# Patient Record
Sex: MALE | Race: WHITE | NOT HISPANIC OR LATINO | Employment: OTHER | URBAN - NONMETROPOLITAN AREA
[De-identification: names, ages, dates, MRNs, and addresses within clinical notes are randomized per-mention and may not be internally consistent; named-entity substitution may affect disease eponyms.]

---

## 2020-03-16 ENCOUNTER — HOSPITAL ENCOUNTER (INPATIENT)
Facility: HOSPITAL | Age: 55
LOS: 3 days | Discharge: HOME | End: 2020-03-19
Attending: INTERNAL MEDICINE | Admitting: INTERNAL MEDICINE

## 2020-03-16 DIAGNOSIS — I21.4 NSTEMI (NON-ST ELEVATED MYOCARDIAL INFARCTION) (HCC): Primary | ICD-10-CM

## 2020-03-16 PROCEDURE — 4A023N7 MEASUREMENT OF CARDIAC SAMPLING AND PRESSURE, LEFT HEART, PERCUTANEOUS APPROACH: ICD-10-PCS | Performed by: INTERNAL MEDICINE

## 2020-03-16 PROCEDURE — 4A033BC MEASUREMENT OF ARTERIAL PRESSURE, CORONARY, PERCUTANEOUS APPROACH: ICD-10-PCS | Performed by: INTERNAL MEDICINE

## 2020-03-16 PROCEDURE — B2151ZZ FLUOROSCOPY OF LEFT HEART USING LOW OSMOLAR CONTRAST: ICD-10-PCS | Performed by: INTERNAL MEDICINE

## 2020-03-16 PROCEDURE — 027036Z DILATION OF CORONARY ARTERY, ONE ARTERY WITH THREE DRUG-ELUTING INTRALUMINAL DEVICES, PERCUTANEOUS APPROACH: ICD-10-PCS | Performed by: INTERNAL MEDICINE

## 2020-03-16 PROCEDURE — B2111ZZ FLUOROSCOPY OF MULTIPLE CORONARY ARTERIES USING LOW OSMOLAR CONTRAST: ICD-10-PCS | Performed by: INTERNAL MEDICINE

## 2020-03-17 ENCOUNTER — APPOINTMENT (OUTPATIENT)
Dept: CARDIOLOGY | Facility: HOSPITAL | Age: 55
End: 2020-03-17

## 2020-03-17 PROBLEM — I21.4 NSTEMI (NON-ST ELEVATED MYOCARDIAL INFARCTION) (HCC): Status: ACTIVE | Noted: 2020-03-17

## 2020-03-17 LAB
ANION GAP SERPL CALCULATED.3IONS-SCNC: 12.3 MMOL/L (ref 5–15)
APTT PPP: 46.5 SECONDS (ref 70–100)
APTT PPP: 77.2 SECONDS (ref 70–100)
BASOPHILS # BLD AUTO: 0.15 10*3/MM3 (ref 0–0.2)
BASOPHILS NFR BLD AUTO: 1 % (ref 0–1.5)
BUN BLD-MCNC: 5 MG/DL (ref 6–20)
BUN/CREAT SERPL: 7.2 (ref 7–25)
CALCIUM SPEC-SCNC: 9.2 MG/DL (ref 8.6–10.5)
CHLORIDE SERPL-SCNC: 101 MMOL/L (ref 98–107)
CHOLEST SERPL-MCNC: 134 MG/DL (ref 0–200)
CK MB SERPL-CCNC: 168.6 NG/ML
CK SERPL-CCNC: 997 U/L (ref 20–200)
CO2 SERPL-SCNC: 20.7 MMOL/L (ref 22–29)
CREAT BLD-MCNC: 0.69 MG/DL (ref 0.76–1.27)
DEPRECATED RDW RBC AUTO: 49.8 FL (ref 37–54)
DEPRECATED RDW RBC AUTO: 51.4 FL (ref 37–54)
EOSINOPHIL # BLD AUTO: 0.3 10*3/MM3 (ref 0–0.4)
EOSINOPHIL # BLD MANUAL: 0.35 10*3/MM3 (ref 0–0.4)
EOSINOPHIL NFR BLD AUTO: 2.1 % (ref 0.3–6.2)
EOSINOPHIL NFR BLD MANUAL: 3 % (ref 0.3–6.2)
ERYTHROCYTE [DISTWIDTH] IN BLOOD BY AUTOMATED COUNT: 14.6 % (ref 12.3–15.4)
ERYTHROCYTE [DISTWIDTH] IN BLOOD BY AUTOMATED COUNT: 14.8 % (ref 12.3–15.4)
GFR SERPL CREATININE-BSD FRML MDRD: 118 ML/MIN/1.73
GLUCOSE BLD-MCNC: 101 MG/DL (ref 65–99)
HBA1C MFR BLD: 4.8 % (ref 4.8–5.6)
HCT VFR BLD AUTO: 56.8 % (ref 37.5–51)
HCT VFR BLD AUTO: 57.3 % (ref 37.5–51)
HDLC SERPL-MCNC: 44 MG/DL (ref 40–60)
HGB BLD-MCNC: 18.7 G/DL (ref 13–17.7)
HGB BLD-MCNC: 19.1 G/DL (ref 13–17.7)
IMM GRANULOCYTES # BLD AUTO: 0.06 10*3/MM3 (ref 0–0.05)
IMM GRANULOCYTES NFR BLD AUTO: 0.4 % (ref 0–0.5)
INR PPP: 1.1 (ref 0.9–1.1)
LARGE PLATELETS: ABNORMAL
LDLC SERPL CALC-MCNC: 81 MG/DL (ref 0–100)
LDLC/HDLC SERPL: 1.85 {RATIO}
LYMPHOCYTES # BLD AUTO: 3.62 10*3/MM3 (ref 0.7–3.1)
LYMPHOCYTES # BLD MANUAL: 2.55 10*3/MM3 (ref 0.7–3.1)
LYMPHOCYTES NFR BLD AUTO: 24.9 % (ref 19.6–45.3)
LYMPHOCYTES NFR BLD MANUAL: 13 % (ref 5–12)
LYMPHOCYTES NFR BLD MANUAL: 22 % (ref 19.6–45.3)
MAXIMAL PREDICTED HEART RATE: 163 BPM
MCH RBC QN AUTO: 31.2 PG (ref 26.6–33)
MCH RBC QN AUTO: 31.3 PG (ref 26.6–33)
MCHC RBC AUTO-ENTMCNC: 32.9 G/DL (ref 31.5–35.7)
MCHC RBC AUTO-ENTMCNC: 33.3 G/DL (ref 31.5–35.7)
MCV RBC AUTO: 93.6 FL (ref 79–97)
MCV RBC AUTO: 95 FL (ref 79–97)
MONOCYTES # BLD AUTO: 1.01 10*3/MM3 (ref 0.1–0.9)
MONOCYTES # BLD AUTO: 1.51 10*3/MM3 (ref 0.1–0.9)
MONOCYTES NFR BLD AUTO: 7 % (ref 5–12)
NEUTROPHILS # BLD AUTO: 6.95 10*3/MM3 (ref 1.7–7)
NEUTROPHILS # BLD AUTO: 9.38 10*3/MM3 (ref 1.7–7)
NEUTROPHILS NFR BLD AUTO: 64.6 % (ref 42.7–76)
NEUTROPHILS NFR BLD MANUAL: 60 % (ref 42.7–76)
NRBC BLD AUTO-RTO: 0 /100 WBC (ref 0–0.2)
PLATELET # BLD AUTO: 267 10*3/MM3 (ref 140–450)
PLATELET # BLD AUTO: 290 10*3/MM3 (ref 140–450)
PMV BLD AUTO: 9.4 FL (ref 6–12)
PMV BLD AUTO: 9.6 FL (ref 6–12)
POTASSIUM BLD-SCNC: 4.2 MMOL/L (ref 3.5–5.2)
PROTHROMBIN TIME: 14.7 SECONDS (ref 12–15.1)
RBC # BLD AUTO: 5.98 10*6/MM3 (ref 4.14–5.8)
RBC # BLD AUTO: 6.12 10*6/MM3 (ref 4.14–5.8)
RBC MORPH BLD: NORMAL
SMALL PLATELETS BLD QL SMEAR: ADEQUATE
SODIUM BLD-SCNC: 134 MMOL/L (ref 136–145)
STRESS TARGET HR: 139 BPM
TRIGL SERPL-MCNC: 43 MG/DL (ref 0–150)
TROPONIN T SERPL-MCNC: 0.89 NG/ML (ref 0–0.03)
TROPONIN T SERPL-MCNC: 1.28 NG/ML (ref 0–0.03)
TROPONIN T SERPL-MCNC: 1.54 NG/ML (ref 0–0.03)
VARIANT LYMPHS NFR BLD MANUAL: 2 % (ref 0–5)
VLDLC SERPL-MCNC: 8.6 MG/DL
WBC MORPH BLD: NORMAL
WBC NRBC COR # BLD: 11.58 10*3/MM3 (ref 3.4–10.8)
WBC NRBC COR # BLD: 14.52 10*3/MM3 (ref 3.4–10.8)

## 2020-03-17 PROCEDURE — 80048 BASIC METABOLIC PNL TOTAL CA: CPT | Performed by: INTERNAL MEDICINE

## 2020-03-17 PROCEDURE — 25010000002 ENOXAPARIN PER 10 MG: Performed by: INTERNAL MEDICINE

## 2020-03-17 PROCEDURE — 93306 TTE W/DOPPLER COMPLETE: CPT

## 2020-03-17 PROCEDURE — C1769 GUIDE WIRE: HCPCS | Performed by: INTERNAL MEDICINE

## 2020-03-17 PROCEDURE — 25010000002 MORPHINE PER 10 MG: Performed by: INTERNAL MEDICINE

## 2020-03-17 PROCEDURE — 85730 THROMBOPLASTIN TIME PARTIAL: CPT | Performed by: INTERNAL MEDICINE

## 2020-03-17 PROCEDURE — 93005 ELECTROCARDIOGRAM TRACING: CPT | Performed by: INTERNAL MEDICINE

## 2020-03-17 PROCEDURE — 85610 PROTHROMBIN TIME: CPT | Performed by: INTERNAL MEDICINE

## 2020-03-17 PROCEDURE — 99223 1ST HOSP IP/OBS HIGH 75: CPT | Performed by: INTERNAL MEDICINE

## 2020-03-17 PROCEDURE — 93458 L HRT ARTERY/VENTRICLE ANGIO: CPT | Performed by: INTERNAL MEDICINE

## 2020-03-17 PROCEDURE — C1725 CATH, TRANSLUMIN NON-LASER: HCPCS | Performed by: INTERNAL MEDICINE

## 2020-03-17 PROCEDURE — 25010000002 EPTIFIBATIDE PER 5 MG: Performed by: INTERNAL MEDICINE

## 2020-03-17 PROCEDURE — 85025 COMPLETE CBC W/AUTO DIFF WBC: CPT | Performed by: INTERNAL MEDICINE

## 2020-03-17 PROCEDURE — 25010000002 BIVALIRUDIN TRIFLUOROACETATE 250 MG RECONSTITUTED SOLUTION 1 EACH VIAL: Performed by: INTERNAL MEDICINE

## 2020-03-17 PROCEDURE — C1887 CATHETER, GUIDING: HCPCS | Performed by: INTERNAL MEDICINE

## 2020-03-17 PROCEDURE — 85027 COMPLETE CBC AUTOMATED: CPT | Performed by: INTERNAL MEDICINE

## 2020-03-17 PROCEDURE — 80061 LIPID PANEL: CPT | Performed by: INTERNAL MEDICINE

## 2020-03-17 PROCEDURE — 25010000003 LIDOCAINE 1 % SOLUTION: Performed by: INTERNAL MEDICINE

## 2020-03-17 PROCEDURE — 82553 CREATINE MB FRACTION: CPT | Performed by: INTERNAL MEDICINE

## 2020-03-17 PROCEDURE — 25010000002 ADENOSINE (DIAGNOSTIC) PER 30 MG: Performed by: INTERNAL MEDICINE

## 2020-03-17 PROCEDURE — 25010000002 HEPARIN (PORCINE) PER 1000 UNITS: Performed by: INTERNAL MEDICINE

## 2020-03-17 PROCEDURE — 83036 HEMOGLOBIN GLYCOSYLATED A1C: CPT | Performed by: INTERNAL MEDICINE

## 2020-03-17 PROCEDURE — 83695 ASSAY OF LIPOPROTEIN(A): CPT | Performed by: INTERNAL MEDICINE

## 2020-03-17 PROCEDURE — 94799 UNLISTED PULMONARY SVC/PX: CPT

## 2020-03-17 PROCEDURE — 93571 IV DOP VEL&/PRESS C FLO 1ST: CPT | Performed by: INTERNAL MEDICINE

## 2020-03-17 PROCEDURE — C9600 PERC DRUG-EL COR STENT SING: HCPCS | Performed by: INTERNAL MEDICINE

## 2020-03-17 PROCEDURE — 84484 ASSAY OF TROPONIN QUANT: CPT | Performed by: INTERNAL MEDICINE

## 2020-03-17 PROCEDURE — 99153 MOD SED SAME PHYS/QHP EA: CPT | Performed by: INTERNAL MEDICINE

## 2020-03-17 PROCEDURE — 0 IOPAMIDOL PER 1 ML: Performed by: INTERNAL MEDICINE

## 2020-03-17 PROCEDURE — 25010000002 FENTANYL CITRATE (PF) 100 MCG/2ML SOLUTION: Performed by: INTERNAL MEDICINE

## 2020-03-17 PROCEDURE — 85007 BL SMEAR W/DIFF WBC COUNT: CPT | Performed by: INTERNAL MEDICINE

## 2020-03-17 PROCEDURE — 82550 ASSAY OF CK (CPK): CPT | Performed by: INTERNAL MEDICINE

## 2020-03-17 PROCEDURE — C1874 STENT, COATED/COV W/DEL SYS: HCPCS | Performed by: INTERNAL MEDICINE

## 2020-03-17 PROCEDURE — C1894 INTRO/SHEATH, NON-LASER: HCPCS | Performed by: INTERNAL MEDICINE

## 2020-03-17 PROCEDURE — 25010000002 MIDAZOLAM PER 1MG: Performed by: INTERNAL MEDICINE

## 2020-03-17 PROCEDURE — 99152 MOD SED SAME PHYS/QHP 5/>YRS: CPT | Performed by: INTERNAL MEDICINE

## 2020-03-17 DEVICE — XIENCE SIERRA™ EVEROLIMUS ELUTING CORONARY STENT SYSTEM 3.50 MM X 18 MM / RAPID-EXCHANGE
Type: IMPLANTABLE DEVICE | Status: FUNCTIONAL
Brand: XIENCE SIERRA™

## 2020-03-17 DEVICE — XIENCE SIERRA™ EVEROLIMUS ELUTING CORONARY STENT SYSTEM 2.75 MM X 23 MM / RAPID-EXCHANGE
Type: IMPLANTABLE DEVICE | Status: FUNCTIONAL
Brand: XIENCE SIERRA™

## 2020-03-17 RX ORDER — EPTIFIBATIDE 0.75 MG/ML
INJECTION, SOLUTION INTRAVENOUS CONTINUOUS PRN
Status: COMPLETED | OUTPATIENT
Start: 2020-03-17 | End: 2020-03-17

## 2020-03-17 RX ORDER — ACETAMINOPHEN 650 MG/1
650 SUPPOSITORY RECTAL EVERY 4 HOURS PRN
Status: DISCONTINUED | OUTPATIENT
Start: 2020-03-17 | End: 2020-03-19 | Stop reason: HOSPADM

## 2020-03-17 RX ORDER — SODIUM CHLORIDE 9 MG/ML
100 INJECTION, SOLUTION INTRAVENOUS CONTINUOUS
Status: DISCONTINUED | OUTPATIENT
Start: 2020-03-17 | End: 2020-03-17

## 2020-03-17 RX ORDER — ACETAMINOPHEN 325 MG/1
650 TABLET ORAL EVERY 4 HOURS PRN
Status: DISCONTINUED | OUTPATIENT
Start: 2020-03-17 | End: 2020-03-19 | Stop reason: HOSPADM

## 2020-03-17 RX ORDER — ONDANSETRON 2 MG/ML
4 INJECTION INTRAMUSCULAR; INTRAVENOUS EVERY 6 HOURS PRN
Status: DISCONTINUED | OUTPATIENT
Start: 2020-03-17 | End: 2020-03-17

## 2020-03-17 RX ORDER — EPTIFIBATIDE 0.75 MG/ML
2 INJECTION, SOLUTION INTRAVENOUS CONTINUOUS
Status: DISPENSED | OUTPATIENT
Start: 2020-03-17 | End: 2020-03-17

## 2020-03-17 RX ORDER — ONDANSETRON 2 MG/ML
4 INJECTION INTRAMUSCULAR; INTRAVENOUS EVERY 6 HOURS PRN
Status: DISCONTINUED | OUTPATIENT
Start: 2020-03-17 | End: 2020-03-19 | Stop reason: HOSPADM

## 2020-03-17 RX ORDER — ASPIRIN 81 MG/1
81 TABLET ORAL DAILY
Status: DISCONTINUED | OUTPATIENT
Start: 2020-03-17 | End: 2020-03-19 | Stop reason: HOSPADM

## 2020-03-17 RX ORDER — LOSARTAN POTASSIUM 50 MG/1
50 TABLET ORAL DAILY
Status: DISCONTINUED | OUTPATIENT
Start: 2020-03-17 | End: 2020-03-19 | Stop reason: HOSPADM

## 2020-03-17 RX ORDER — HYDROCODONE BITARTRATE AND ACETAMINOPHEN 5; 325 MG/1; MG/1
1 TABLET ORAL EVERY 4 HOURS PRN
Status: DISCONTINUED | OUTPATIENT
Start: 2020-03-17 | End: 2020-03-19 | Stop reason: HOSPADM

## 2020-03-17 RX ORDER — NITROGLYCERIN 0.4 MG/1
0.4 TABLET SUBLINGUAL
Status: DISCONTINUED | OUTPATIENT
Start: 2020-03-17 | End: 2020-03-19 | Stop reason: HOSPADM

## 2020-03-17 RX ORDER — LIDOCAINE HYDROCHLORIDE 10 MG/ML
INJECTION, SOLUTION INFILTRATION; PERINEURAL AS NEEDED
Status: DISCONTINUED | OUTPATIENT
Start: 2020-03-17 | End: 2020-03-17 | Stop reason: HOSPADM

## 2020-03-17 RX ORDER — ALPRAZOLAM 0.5 MG/1
0.5 TABLET ORAL 3 TIMES DAILY PRN
Status: DISCONTINUED | OUTPATIENT
Start: 2020-03-17 | End: 2020-03-19 | Stop reason: HOSPADM

## 2020-03-17 RX ORDER — MORPHINE SULFATE 2 MG/ML
2 INJECTION, SOLUTION INTRAMUSCULAR; INTRAVENOUS EVERY 4 HOURS PRN
Status: DISCONTINUED | OUTPATIENT
Start: 2020-03-17 | End: 2020-03-19 | Stop reason: HOSPADM

## 2020-03-17 RX ORDER — FENTANYL CITRATE 50 UG/ML
INJECTION, SOLUTION INTRAMUSCULAR; INTRAVENOUS AS NEEDED
Status: DISCONTINUED | OUTPATIENT
Start: 2020-03-17 | End: 2020-03-17 | Stop reason: HOSPADM

## 2020-03-17 RX ORDER — HEPARIN SODIUM 10000 [USP'U]/100ML
12 INJECTION, SOLUTION INTRAVENOUS
Status: CANCELLED | OUTPATIENT
Start: 2020-03-17

## 2020-03-17 RX ORDER — ATORVASTATIN CALCIUM 80 MG/1
80 TABLET, FILM COATED ORAL NIGHTLY
Status: DISCONTINUED | OUTPATIENT
Start: 2020-03-17 | End: 2020-03-19 | Stop reason: HOSPADM

## 2020-03-17 RX ORDER — ACETAMINOPHEN 325 MG/1
650 TABLET ORAL EVERY 4 HOURS PRN
Status: DISCONTINUED | OUTPATIENT
Start: 2020-03-17 | End: 2020-03-17

## 2020-03-17 RX ORDER — NICOTINE 21 MG/24HR
1 PATCH, TRANSDERMAL 24 HOURS TRANSDERMAL EVERY 24 HOURS
Status: DISCONTINUED | OUTPATIENT
Start: 2020-03-17 | End: 2020-03-19 | Stop reason: HOSPADM

## 2020-03-17 RX ORDER — METOPROLOL SUCCINATE 25 MG/1
25 TABLET, EXTENDED RELEASE ORAL
Status: DISCONTINUED | OUTPATIENT
Start: 2020-03-17 | End: 2020-03-19 | Stop reason: HOSPADM

## 2020-03-17 RX ORDER — NALOXONE HCL 0.4 MG/ML
0.4 VIAL (ML) INJECTION
Status: DISCONTINUED | OUTPATIENT
Start: 2020-03-17 | End: 2020-03-19 | Stop reason: HOSPADM

## 2020-03-17 RX ORDER — HYDROCODONE BITARTRATE AND ACETAMINOPHEN 5; 325 MG/1; MG/1
1 TABLET ORAL EVERY 4 HOURS PRN
Status: DISCONTINUED | OUTPATIENT
Start: 2020-03-17 | End: 2020-03-17

## 2020-03-17 RX ORDER — MIDAZOLAM HYDROCHLORIDE 2 MG/2ML
INJECTION, SOLUTION INTRAMUSCULAR; INTRAVENOUS AS NEEDED
Status: DISCONTINUED | OUTPATIENT
Start: 2020-03-17 | End: 2020-03-17 | Stop reason: HOSPADM

## 2020-03-17 RX ORDER — MORPHINE SULFATE 2 MG/ML
2 INJECTION, SOLUTION INTRAMUSCULAR; INTRAVENOUS ONCE
Status: COMPLETED | OUTPATIENT
Start: 2020-03-17 | End: 2020-03-17

## 2020-03-17 RX ORDER — SODIUM CHLORIDE 0.9 % (FLUSH) 0.9 %
10 SYRINGE (ML) INJECTION EVERY 12 HOURS SCHEDULED
Status: DISCONTINUED | OUTPATIENT
Start: 2020-03-17 | End: 2020-03-19 | Stop reason: HOSPADM

## 2020-03-17 RX ORDER — ASPIRIN 81 MG/1
81 TABLET ORAL DAILY
Status: DISCONTINUED | OUTPATIENT
Start: 2020-03-17 | End: 2020-03-17

## 2020-03-17 RX ORDER — HEPARIN SODIUM 10000 [USP'U]/100ML
1000 INJECTION, SOLUTION INTRAVENOUS
Status: DISCONTINUED | OUTPATIENT
Start: 2020-03-17 | End: 2020-03-17

## 2020-03-17 RX ORDER — SODIUM CHLORIDE 9 MG/ML
100 INJECTION, SOLUTION INTRAVENOUS CONTINUOUS
Status: DISCONTINUED | OUTPATIENT
Start: 2020-03-17 | End: 2020-03-18

## 2020-03-17 RX ORDER — SODIUM CHLORIDE 0.9 % (FLUSH) 0.9 %
10 SYRINGE (ML) INJECTION AS NEEDED
Status: DISCONTINUED | OUTPATIENT
Start: 2020-03-17 | End: 2020-03-19 | Stop reason: HOSPADM

## 2020-03-17 RX ADMIN — SODIUM CHLORIDE, PRESERVATIVE FREE 10 ML: 5 INJECTION INTRAVENOUS at 10:00

## 2020-03-17 RX ADMIN — ENOXAPARIN SODIUM 40 MG: 40 INJECTION SUBCUTANEOUS at 09:58

## 2020-03-17 RX ADMIN — HYDROCODONE BITARTRATE AND ACETAMINOPHEN 1 TABLET: 5; 325 TABLET ORAL at 19:17

## 2020-03-17 RX ADMIN — ALPRAZOLAM 0.5 MG: 0.5 TABLET ORAL at 19:17

## 2020-03-17 RX ADMIN — SODIUM CHLORIDE, PRESERVATIVE FREE 10 ML: 5 INJECTION INTRAVENOUS at 21:31

## 2020-03-17 RX ADMIN — HEPARIN SODIUM AND DEXTROSE 1000 UNITS/HR: 10000; 5 INJECTION INTRAVENOUS at 01:08

## 2020-03-17 RX ADMIN — MORPHINE SULFATE 2 MG: 2 INJECTION, SOLUTION INTRAMUSCULAR; INTRAVENOUS at 22:53

## 2020-03-17 RX ADMIN — ATORVASTATIN CALCIUM 80 MG: 80 TABLET, FILM COATED ORAL at 00:56

## 2020-03-17 RX ADMIN — LOSARTAN POTASSIUM 50 MG: 50 TABLET, FILM COATED ORAL at 09:58

## 2020-03-17 RX ADMIN — MORPHINE SULFATE 2 MG: 2 INJECTION, SOLUTION INTRAMUSCULAR; INTRAVENOUS at 02:04

## 2020-03-17 RX ADMIN — TICAGRELOR 90 MG: 90 TABLET ORAL at 09:59

## 2020-03-17 RX ADMIN — HYDROCODONE BITARTRATE AND ACETAMINOPHEN 1 TABLET: 5; 325 TABLET ORAL at 14:38

## 2020-03-17 RX ADMIN — NITROGLYCERIN 0.4 MG: 0.4 TABLET SUBLINGUAL at 05:07

## 2020-03-17 RX ADMIN — ALPRAZOLAM 0.5 MG: 0.5 TABLET ORAL at 10:55

## 2020-03-17 RX ADMIN — EPTIFIBATIDE 2 MCG/KG/MIN: 75 INJECTION INTRAVENOUS at 09:42

## 2020-03-17 RX ADMIN — METOPROLOL SUCCINATE 25 MG: 25 TABLET, EXTENDED RELEASE ORAL at 09:59

## 2020-03-17 RX ADMIN — SODIUM CHLORIDE 100 ML/HR: 9 INJECTION, SOLUTION INTRAVENOUS at 21:31

## 2020-03-17 RX ADMIN — NICOTINE 1 PATCH: 21 PATCH TRANSDERMAL at 09:59

## 2020-03-17 RX ADMIN — HYDROCODONE BITARTRATE AND ACETAMINOPHEN 1 TABLET: 5; 325 TABLET ORAL at 09:59

## 2020-03-17 RX ADMIN — TICAGRELOR 90 MG: 90 TABLET ORAL at 21:30

## 2020-03-17 RX ADMIN — SODIUM CHLORIDE, PRESERVATIVE FREE 10 ML: 5 INJECTION INTRAVENOUS at 00:19

## 2020-03-17 RX ADMIN — NITROGLYCERIN 0.4 MG: 0.4 TABLET SUBLINGUAL at 05:02

## 2020-03-17 RX ADMIN — MORPHINE SULFATE 2 MG: 2 INJECTION, SOLUTION INTRAMUSCULAR; INTRAVENOUS at 00:58

## 2020-03-17 RX ADMIN — ASPIRIN 81 MG: 81 TABLET, COATED ORAL at 09:58

## 2020-03-17 RX ADMIN — ATORVASTATIN CALCIUM 80 MG: 80 TABLET, FILM COATED ORAL at 21:30

## 2020-03-17 RX ADMIN — SODIUM CHLORIDE 100 ML/HR: 9 INJECTION, SOLUTION INTRAVENOUS at 09:45

## 2020-03-17 RX ADMIN — MORPHINE SULFATE 2 MG: 2 INJECTION, SOLUTION INTRAMUSCULAR; INTRAVENOUS at 09:41

## 2020-03-17 NOTE — H&P
[facility]   HISTORY AND PHYSICAL      Name:  Flavio Haines   Age:  55 y.o.  Sex:  male  :  1965  MRN:  [id number]   Visit Number:  [id number]  Admission Date:  3/16/2020  Date Of Service:  20  Primary Care Physician:  Provider, No Known    Chief Complaint:     Chest pain    History Of Presenting Illness:      Patient is a 55-year-old male with no past medical history and does not see a primary physician who presents to the emergency room at [facility] with complaints of left-sided chest pain/pressure.  Patient is accompanied by his wife who assists with history.  She states that he awoke at approximately 4 AM this morning complaining of left-sided chest pressure with radiation down the left arm.  Patient took a Tylenol without resolution of symptoms but still went to work today.  He said that his pain then developed more similar to indigestion.  It continued on and off all day was not alleviated by anything.  Patient was then convinced by his wife to report to the emergency room.  Outside EKG did not show signs of ST elevation.  Patient did have significant elevation of troponin at 16.669.  CK-MB was 198 and proBNP was 456.  At 2115 patient received 324 mg aspirin and 180 mg of Brilinta.  He also received 4 mg IV morphine prior to transfer.  Patient also started on heparin drip prior to transfer.  Patient states that his younger brother  at 44 years of age secondary to a myocardial infarction.  His mother has had cardiac stenting in the past.  Patient continues to smoke approximately 1 to 2 packs/day.  No significant alcohol use.  Dr. Dixon was consulted in transfer.    Review Of Systems:     General ROS: Patient denies any fevers, chills or loss of consciousness.   Psychological ROS: No history of any hallucinations and delusions.  Ophthalmic ROS: No history of any diplopia or transient loss of vision.  ENT ROS: No history of sore throat, nasal congestion or ear pain.     Allergy and Immunology ROS: No history of rash or itching.  Hematological and Lymphatic ROS: No history of neck swelling or easy bleeding.  Endocrine ROS: No history of any recent unintentional weight gain or loss.  Respiratory ROS: No history of cough or shortness of breath.   Cardiovascular ROS: Patient positive for epigastric pain chest pressure with left-sided arm pain.  Gastrointestinal ROS: No history of nausea and vomiting. Denies any abdominal pain. No diarrhea.   Genito-Urinary ROS: No history of dysuria or hematuria.  Musculoskeletal ROS: No muscle pain. No calf pain.   Neurological ROS: No history of any focal weakness. No loss of consciousness.   Dermatological ROS: No history of any redness or pruritis.     Past Medical History:    No past medical history on file.    Past Surgical history:    No past surgical history on file.    Social History:    Social History     Socioeconomic History   • Marital status:      Spouse name: Not on file   • Number of children: Not on file   • Years of education: Not on file   • Highest education level: Not on file       Family History:    No family history on file.    Allergies:      Nyquil multi-symptom [[id number]-doxylamine-dm-apap] and Penicillins    Home Medications:    Prior to Admission Medications     None             ED Medications:    Medications   sodium chloride 0.9 % flush 10 mL (has no administration in time range)   sodium chloride 0.9 % flush 10 mL (10 mL Intravenous Given 3/17/20 0019)   metoprolol tartrate (LOPRESSOR) tablet 25 mg (has no administration in time range)   losartan (COZAAR) tablet 50 mg (has no administration in time range)   atorvastatin (LIPITOR) tablet 80 mg (has no administration in time range)   acetaminophen (TYLENOL) tablet 650 mg (has no administration in time range)     Or   acetaminophen (TYLENOL) suppository 650 mg (has no administration in time range)   Morphine sulfate (PF) injection 2 mg (has no administration in  time range)     And   naloxone (NARCAN) injection 0.4 mg (has no administration in time range)   ondansetron (ZOFRAN) injection 4 mg (has no administration in time range)   heparin 78294 units/250 ml (100 units/ml) in D5W (has no administration in time range)       Vital Signs:    Temp:  [98 °F (36.7 °C)] 98 °F (36.7 °C)  Heart Rate:  [64-85] 64  Resp:  [22] 22  BP: (112-125)/(82-96) 125/82        03/17/20  0002   Weight: 91.1 kg (200 lb 14.4 oz)       Body mass index is 30.55 kg/m².    Physical Exam:    General Appearance:  Alert and cooperative, not in any acute distress.   Head:  Atraumatic and normocephalic, without obvious abnormality.   Eyes:          PERRLA, conjunctivae and sclerae normal, no Icterus. No pallor. Extraocular movements are within normal limits.   Ears:  Ears appear intact with no abnormalities noted.   Throat: No oral lesions, no thrush, oral mucosa moist.   Neck: Supple, trachea midline       Lungs:   Chest shape is normal. Breath sounds heard bilaterally equally.  No crackles or wheezing. No Pleural rub or bronchial breathing.   Heart:  Normal S1 and S2, no murmur, no gallop, no rub. No JVD.   Abdomen:   Normal bowel sounds, Soft, non-tender, non-distended, no guarding, no rebound tenderness.   Extremities: Moves all extremities well, no edema, no cyanosis, no clubbing.   Pulses: Pulses palpable and equal bilaterally.   Skin: No bleeding, bruising or rash.   Neurologic: Alert and oriented x 3. Moves all four limbs equally. No tremors. No facial asymmetry.     Laboratory data:    I have reviewed the labs done in the emergency room.          Invalid input(s): LABALBU, PROT                                    Invalid input(s): USDES,  BLOODU, NITRITITE, BACT, EP  Pain Management Panel     There is no flowsheet data to display.              EKG:      EKG personally reviewed.  Normal sinus rhythm rate of 61.  No signs of acute infarct or ST elevation.    Radiology:    Imaging Results (Last 72  Hours)     ** No results found for the last 72 hours. **            NSTEMI (non-ST elevated myocardial infarction) (CMS/[facility])      Assessment:    NSTEMI, type 1  Tobacco abuse    Plan:    Admit patient to the ICU.  We will continue with heparin drip that was started at outside facility.  Have ordered high intensity statin, beta-blocker, and losartan.  Dr. Dixon notified from from outside facility.  We will keep patient n.p.o. in anticipation of PCI in the a.m.  We will continue to trend cardiac enzymes and EKG.  Morphine as needed for pain control.  2D echocardiogram.  Further orders as clinical course dictates.  Patient will be provided nicotine patch.  Counseled extensively on tobacco cessation.  We will further stratify with A1c and lipid panel.    Advance Care Planning   ACP discussion was declined by the patient. Patient does not have an advance directive, information provided. Patient does not have an advance directive, declines further assistance.    Homer Grover,   03/17/20  00:49    Dictated utilizing Dragon dictation.

## 2020-03-17 NOTE — PAYOR COMM NOTE
"TO:[facility]  FROM:CHAD MCKINLEY, RN PHONE [phone]  INPT NOTIFICATION AND CLINICALS    Flavio Haines (55 y.o. Male)     Date of Birth Social Security Number Address Home Phone MRN    1965  [address] [facility]  [place] [address] [phone] [id number]    Sikhism Marital Status          Evangelical        Admission Date Admission Type Admitting Provider Attending Provider Department, Room/Bed    3/16/20 Urgent Homer Grover DO Majumder, Mosumi, MD [facility] INTENSIVE CARE, I04/    Discharge Date Discharge Disposition Discharge Destination                       Attending Provider:  Abelardo Woodward MD    Allergies:  Nyquil Multi-symptom [Pseudoeph-doxylamine-dm-apap], Penicillins    Isolation:  None   Infection:  None   Code Status:  CPR    Ht:  172.7 cm (68\")   Wt:  91.1 kg (200 lb 14.4 oz)    Admission Cmt:  None   Principal Problem:  None                Active Insurance as of 3/16/2020     Primary Coverage     Payor Plan Insurance Group Employer/Plan Group    ANTHEM MEDICAID ANTHEM MEDICAID [id number]     Payor Plan Address Payor Plan Phone Number Payor Plan Fax Number Effective Dates    [address] [address] [phone]  2019 - None Entered    [facility] [id number]       Subscriber Name Subscriber Birth Date Member ID       FLAVIO HAINES 1965 [id number]                 Emergency Contacts      (Rel.) Home Phone Work Phone Mobile Phone    HUGO VANEGAS [phone] -- --    HAINESGELACIO (Spouse) [phone] -- --               History & Physical      Homer Grover DO at 20 0048              [facility] HOSPITALIST   HISTORY AND PHYSICAL      Name:  Flavio Haines   Age:  55 y.o.  Sex:  male  :  1965  MRN:  [id number]   Visit Number:  [id number]  Admission Date:  3/16/2020  Date Of Service:  20  Primary Care Physician:  Provider, No Known    Chief Complaint:     Chest " pain    History Of Presenting Illness:      Patient is a 55-year-old male with no past medical history and does not see a primary physician who presents to the emergency room at [facility] with complaints of left-sided chest pain/pressure.  Patient is accompanied by his wife who assists with history.  She states that he awoke at approximately 4 AM this morning complaining of left-sided chest pressure with radiation down the left arm.  Patient took a Tylenol without resolution of symptoms but still went to work today.  He said that his pain then developed more similar to indigestion.  It continued on and off all day was not alleviated by anything.  Patient was then convinced by his wife to report to the emergency room.  Outside EKG did not show signs of ST elevation.  Patient did have significant elevation of troponin at 16.669.  CK-MB was 198 and proBNP was 456.  At  patient received 324 mg aspirin and 180 mg of Brilinta.  He also received 4 mg IV morphine prior to transfer.  Patient also started on heparin drip prior to transfer.  Patient states that his younger brother  at 44 years of age secondary to a myocardial infarction.  His mother has had cardiac stenting in the past.  Patient continues to smoke approximately 1 to 2 packs/day.  No significant alcohol use.  Dr. Dixon was consulted in transfer.    Review Of Systems:     General ROS: Patient denies any fevers, chills or loss of consciousness.   Psychological ROS: No history of any hallucinations and delusions.  Ophthalmic ROS: No history of any diplopia or transient loss of vision.  ENT ROS: No history of sore throat, nasal congestion or ear pain.   Allergy and Immunology ROS: No history of rash or itching.  Hematological and Lymphatic ROS: No history of neck swelling or easy bleeding.  Endocrine ROS: No history of any recent unintentional weight gain or loss.  Respiratory ROS: No history of cough or shortness of breath.   Cardiovascular ROS: Patient  positive for epigastric pain chest pressure with left-sided arm pain.  Gastrointestinal ROS: No history of nausea and vomiting. Denies any abdominal pain. No diarrhea.   Genito-Urinary ROS: No history of dysuria or hematuria.  Musculoskeletal ROS: No muscle pain. No calf pain.   Neurological ROS: No history of any focal weakness. No loss of consciousness.   Dermatological ROS: No history of any redness or pruritis.     Past Medical History:    No past medical history on file.    Past Surgical history:    No past surgical history on file.    Social History:    Social History     Socioeconomic History   • Marital status:      Spouse name: Not on file   • Number of children: Not on file   • Years of education: Not on file   • Highest education level: Not on file       Family History:    No family history on file.    Allergies:      Nyquil multi-symptom [pseudoeph-doxylamine-dm-apap] and Penicillins    Home Medications:    Prior to Admission Medications     None             ED Medications:    Medications   sodium chloride 0.9 % flush 10 mL (has no administration in time range)   sodium chloride 0.9 % flush 10 mL (10 mL Intravenous Given 3/17/20 0019)   metoprolol tartrate (LOPRESSOR) tablet 25 mg (has no administration in time range)   losartan (COZAAR) tablet 50 mg (has no administration in time range)   atorvastatin (LIPITOR) tablet 80 mg (has no administration in time range)   acetaminophen (TYLENOL) tablet 650 mg (has no administration in time range)     Or   acetaminophen (TYLENOL) suppository 650 mg (has no administration in time range)   Morphine sulfate (PF) injection 2 mg (has no administration in time range)     And   naloxone (NARCAN) injection 0.4 mg (has no administration in time range)   ondansetron (ZOFRAN) injection 4 mg (has no administration in time range)   heparin 21431 units/250 ml (100 units/ml) in D5W (has no administration in time range)       Vital Signs:    Temp:  [98 °F (36.7 °C)] 98 °F  (36.7 °C)  Heart Rate:  [64-85] 64  Resp:  [22] 22  BP: (112-125)/(82-96) 125/82        03/17/20  0002   Weight: 91.1 kg (200 lb 14.4 oz)       Body mass index is 30.55 kg/m².    Physical Exam:    General Appearance:  Alert and cooperative, not in any acute distress.   Head:  Atraumatic and normocephalic, without obvious abnormality.   Eyes:          PERRLA, conjunctivae and sclerae normal, no Icterus. No pallor. Extraocular movements are within normal limits.   Ears:  Ears appear intact with no abnormalities noted.   Throat: No oral lesions, no thrush, oral mucosa moist.   Neck: Supple, trachea midline       Lungs:   Chest shape is normal. Breath sounds heard bilaterally equally.  No crackles or wheezing. No Pleural rub or bronchial breathing.   Heart:  Normal S1 and S2, no murmur, no gallop, no rub. No JVD.   Abdomen:   Normal bowel sounds, Soft, non-tender, non-distended, no guarding, no rebound tenderness.   Extremities: Moves all extremities well, no edema, no cyanosis, no clubbing.   Pulses: Pulses palpable and equal bilaterally.   Skin: No bleeding, bruising or rash.   Neurologic: Alert and oriented x 3. Moves all four limbs equally. No tremors. No facial asymmetry.     Laboratory data:    I have reviewed the labs done in the emergency room.          Invalid input(s): LABALBU, PROT                                    Invalid input(s): USDES,  BLOODU, NITRITITE, BACT, EP  Pain Management Panel     There is no flowsheet data to display.              EKG:      EKG personally reviewed.  Normal sinus rhythm rate of 61.  No signs of acute infarct or ST elevation.    Radiology:    Imaging Results (Last 72 Hours)     ** No results found for the last 72 hours. **            NSTEMI (non-ST elevated myocardial infarction) (CMS/[facility])      Assessment:    NSTEMI, type 1  Tobacco abuse    Plan:    Admit patient to the ICU.  We will continue with heparin drip that was started at outside facility.  Have ordered high intensity  statin, beta-blocker, and losartan.  Dr. Dixon notified from from outside facility.  We will keep patient n.p.o. in anticipation of PCI in the a.m.  We will continue to trend cardiac enzymes and EKG.  Morphine as needed for pain control.  2D echocardiogram.  Further orders as clinical course dictates.  Patient will be provided nicotine patch.  Counseled extensively on tobacco cessation.  We will further stratify with A1c and lipid panel.    Advance Care Planning   ACP discussion was declined by the patient. Patient does not have an advance directive, information provided. Patient does not have an advance directive, declines further assistance.    Homer Grover DO  03/17/20  00:49    Dictated utilizing Dragon dictation.      Electronically signed by Homer Grover DO at 03/17/20 0107       Emergency Department Notes    No notes of this type exist for this encounter.           Current Facility-Administered Medications   Medication Dose Route Frequency Provider Last Rate Last Dose   • acetaminophen (TYLENOL) tablet 650 mg  650 mg Oral Q4H PRN Bernardo Dixon MD        Or   • acetaminophen (TYLENOL) suppository 650 mg  650 mg Rectal Q4H PRN Bernardo Dixon MD       • ALPRAZolam (XANAX) tablet 0.5 mg  0.5 mg Oral TID PRN Bernardo Dixon MD   0.5 mg at 03/17/20 1055   • aspirin EC tablet 81 mg  81 mg Oral Daily Bernardo Dixon MD   81 mg at 03/17/20 0958   • atorvastatin (LIPITOR) tablet 80 mg  80 mg Oral Nightly Bernardo Dixon MD   80 mg at 03/17/20 0056   • enoxaparin (LOVENOX) syringe 40 mg  40 mg Subcutaneous Q24H Bernardo Dixon MD   40 mg at 03/17/20 0958   • eptifibatide (INTEGRILIN) 75 MG/100ML infusion  2 mcg/kg/min Intravenous Continuous Bernardo Dixon MD 14.58 mL/hr at 03/17/20 0942 2 mcg/kg/min at 03/17/20 0942   • HYDROcodone-acetaminophen (NORCO) 5-325 MG per tablet 1 tablet  1 tablet Oral Q4H PRN Bernardo Dixon MD    1 tablet at 03/17/20 0959   • losartan (COZAAR) tablet 50 mg  50 mg Oral Daily Bernardo Dixon MD   50 mg at 03/17/20 0958   • metoprolol succinate XL (TOPROL-XL) 24 hr tablet 25 mg  25 mg Oral Q24H Bernardo Dixon MD   25 mg at 03/17/20 0959   • Morphine sulfate (PF) injection 2 mg  2 mg Intravenous Q4H PRN Bernardo Dixon MD   2 mg at 03/17/20 0941    And   • naloxone (NARCAN) injection 0.4 mg  0.4 mg Intravenous Q5 Min PRN Bernardo Dixon MD       • nicotine (NICODERM CQ) 21 MG/24HR patch 1 patch  1 patch Transdermal Q24H Bernardo Dixon MD   1 patch at 03/17/20 0959   • nitroglycerin (NITROSTAT) SL tablet 0.4 mg  0.4 mg Sublingual Q5 Min PRN Bernardo Dixon MD   0.4 mg at 03/17/20 0507   • ondansetron (ZOFRAN) injection 4 mg  4 mg Intravenous Q6H PRN Bernardo Dixon MD       • sodium chloride 0.9 % flush 10 mL  10 mL Intravenous Q12H Bernardo Dixon MD   10 mL at 03/17/20 1000   • sodium chloride 0.9 % flush 10 mL  10 mL Intravenous PRN Bernardo Dixon MD   10 mL at 03/17/20 0019   • sodium chloride 0.9 % infusion  100 mL/hr Intravenous Continuous Bernardo Dixon  mL/hr at 03/17/20 0945 100 mL/hr at 03/17/20 0945   • ticagrelor (BRILINTA) tablet 90 mg  90 mg Oral BID Bernardo Dixon MD   90 mg at 03/17/20 0959       Lab Results (last 24 hours)     Procedure Component Value Units Date/Time    Lipid Panel [[id number]] Collected:  03/17/20 0529    Specimen:  Blood Updated:  03/17/20 0656     Total Cholesterol 134 mg/dL      Triglycerides 43 mg/dL      HDL Cholesterol 44 mg/dL      LDL Cholesterol  81 mg/dL      VLDL Cholesterol 8.6 mg/dL      LDL/HDL Ratio 1.85    Narrative:       Cholesterol Reference Ranges  (U.S. Department of Health and Human Services ATP III Classifications)    Desirable          <200 mg/dL  Borderline High    200-239 mg/dL  High Risk          >240 mg/dL      Triglyceride Reference  Ranges  (U.S. Department of Health and Human Services ATP III Classifications)    Normal           <150 mg/dL  Borderline High  150-199 mg/dL  High             200-499 mg/dL  Very High        >500 mg/dL    HDL Reference Ranges  (U.S. Department of Health and Human Services ATP III Classifcations)    Low     <40 mg/dl (major risk factor for CHD)  High    >60 mg/dl ('negative' risk factor for CHD)        LDL Reference Ranges  (U.S. Department of Health and Human Services ATP III Classifcations)    Optimal          <100 mg/dL  Near Optimal     100-129 mg/dL  Borderline High  130-159 mg/dL  High             160-189 mg/dL  Very High        >189 mg/dL    Troponin [[id number]]  (Abnormal) Collected:  03/17/20 0529    Specimen:  Blood Updated:  03/17/20 0656     Troponin T 1.280 ng/mL     Narrative:       Troponin T Reference Range:  <= 0.03 ng/mL-   Negative for AMI  >0.03 ng/mL-     Abnormal for myocardial necrosis.  Clinicians would have to utilize clinical acumen, EKG, Troponin and serial changes to determine if it is an Acute Myocardial Infarction or myocardial injury due to an underlying chronic condition.       Results may be falsely decreased if patient taking Biotin.      CBC & Differential [[id number]] Collected:  03/17/20 0529    Specimen:  Blood Updated:  03/17/20 0654    Narrative:       The following orders were created for panel order CBC & Differential.  Procedure                               Abnormality         Status                     ---------                               -----------         ------                     Manual Differential[[id number]]          Abnormal            Final result               CBC Auto Differential[[id number]]        Abnormal            Final result                 Please view results for these tests on the individual orders.    Manual Differential [[id number]]  (Abnormal) Collected:  03/17/20 0529    Specimen:  Blood Updated:  03/17/20 0654     Neutrophil % 60.0 %       Lymphocyte % 22.0 %      Monocyte % 13.0 %      Eosinophil % 3.0 %      Atypical Lymphocyte % 2.0 %      Neutrophils Absolute 6.95 10*3/mm3      Lymphocytes Absolute 2.55 10*3/mm3      Monocytes Absolute 1.51 10*3/mm3      Eosinophils Absolute 0.35 10*3/mm3      RBC Morphology Normal     WBC Morphology Normal     Platelet Estimate Adequate     Large Platelets Slight/1+    Basic Metabolic Panel [[id number]]  (Abnormal) Collected:  03/17/20 0529    Specimen:  Blood Updated:  03/17/20 0636     Glucose 101 mg/dL      BUN 5 mg/dL      Creatinine 0.69 mg/dL      Sodium 134 mmol/L      Potassium 4.2 mmol/L      Chloride 101 mmol/L      CO2 20.7 mmol/L      Calcium 9.2 mg/dL      eGFR Non African Amer 118 mL/min/1.73      BUN/Creatinine Ratio 7.2     Anion Gap 12.3 mmol/L     Narrative:       GFR Normal >60  Chronic Kidney Disease <60  Kidney Failure <15      CBC Auto Differential [[id number]]  (Abnormal) Collected:  03/17/20 0529    Specimen:  Blood Updated:  03/17/20 0627     WBC 11.58 10*3/mm3      RBC 5.98 10*6/mm3      Hemoglobin 18.7 g/dL      Hematocrit 56.8 %      MCV 95.0 fL      MCH 31.3 pg      MCHC 32.9 g/dL      RDW 14.8 %      RDW-SD 51.4 fl      MPV 9.6 fL      Platelets 267 10*3/mm3     aPTT [[id number]]  (Abnormal) Collected:  03/17/20 0529    Specimen:  Blood Updated:  03/17/20 0626     PTT 46.5 seconds     Hemoglobin A1c [[id number]]  (Normal) Collected:  03/17/20 0529    Specimen:  Blood Updated:  03/17/20 0612     Hemoglobin A1C 4.80 %     Narrative:       Hemoglobin A1C Ranges:    Increased Risk for Diabetes  5.7% to 6.4%  Diabetes                     >= 6.5%  Diabetic Goal                < 7.0%    Troponin [[id number]]  (Abnormal) Collected:  03/17/20 0045    Specimen:  Blood Updated:  03/17/20 0235     Troponin T 0.891 ng/mL     Narrative:       Troponin T Reference Range:  <= 0.03 ng/mL-   Negative for AMI  >0.03 ng/mL-     Abnormal for myocardial necrosis.  Clinicians would have to utilize clinical  acumen, EKG, Troponin and serial changes to determine if it is an Acute Myocardial Infarction or myocardial injury due to an underlying chronic condition.       Results may be falsely decreased if patient taking Biotin.      Protime-INR [[id number]]  (Normal) Collected:  03/17/20 0050    Specimen:  Blood Updated:  03/17/20 0117     Protime 14.7 Seconds      INR 1.10    Narrative:       Suggested INR therapeutic range for stable oral anticoagulant therapy:    Low Intensity therapy:   1.5-2.0  Moderate Intensity therapy:   2.0-3.0  High Intensity therapy:   2.5-4.0    aPTT [[id number]]  (Normal) Collected:  03/17/20 0050    Specimen:  Blood Updated:  03/17/20 0117     PTT 77.2 seconds     CBC & Differential [[id number]] Collected:  03/17/20 0050    Specimen:  Blood Updated:  03/17/20 0053    Narrative:       The following orders were created for panel order CBC & Differential.  Procedure                               Abnormality         Status                     ---------                               -----------         ------                     CBC Auto Differential[[id number]]        Abnormal            Final result                 Please view results for these tests on the individual orders.    CBC Auto Differential [[id number]]  (Abnormal) Collected:  03/17/20 0050    Specimen:  Blood Updated:  03/17/20 0053     WBC 14.52 10*3/mm3      RBC 6.12 10*6/mm3      Hemoglobin 19.1 g/dL      Hematocrit 57.3 %      MCV 93.6 fL      MCH 31.2 pg      MCHC 33.3 g/dL      RDW 14.6 %      RDW-SD 49.8 fl      MPV 9.4 fL      Platelets 290 10*3/mm3      Neutrophil % 64.6 %      Lymphocyte % 24.9 %      Monocyte % 7.0 %      Eosinophil % 2.1 %      Basophil % 1.0 %      Immature Grans % 0.4 %      Neutrophils, Absolute 9.38 10*3/mm3      Lymphocytes, Absolute 3.62 10*3/mm3      Monocytes, Absolute 1.01 10*3/mm3      Eosinophils, Absolute 0.30 10*3/mm3      Basophils, Absolute 0.15 10*3/mm3      Immature Grans, Absolute 0.06 10*3/mm3       nRBC 0.0 /100 WBC         ECG/EMG Results (last 24 hours)     Procedure Component Value Units Date/Time    ECG 12 Lead [[id number]] Collected:  03/17/20 0055     Updated:  03/17/20 0054    Narrative:       Test Reason : acs  Blood Pressure : **/** mmHG  Vent. Rate : 061 BPM     Atrial Rate : 061 BPM     P-R Int : 160 ms          QRS Dur : 092 ms      QT Int : 456 ms       P-R-T Axes : 060 040 050 degrees     QTc Int : 459 ms    Normal sinus rhythm with sinus arrhythmia  Incomplete right bundle branch block  Nonspecific ST abnormality  Abnormal ECG  No previous ECGs available    Referred By:  MAXINE           Confirmed By:     ECG 12 Lead [[id number]] Collected:  03/17/20 0533     Updated:  03/17/20 0532    Narrative:       Test Reason : acs  Blood Pressure : **/** mmHG  Vent. Rate : 067 BPM     Atrial Rate : 067 BPM     P-R Int : 154 ms          QRS Dur : 096 ms      QT Int : 438 ms       P-R-T Axes : 068 048 051 degrees     QTc Int : 462 ms    Normal sinus rhythm  Possible Left atrial enlargement  Incomplete right bundle branch block  Nonspecific ST abnormality  Abnormal ECG  When compared with ECG of 17-MAR-2020 00:55, (Unconfirmed)  No significant change was found    Referred By:  MAXINE           Confirmed By:     Adult Transthoracic Echo Complete W/ Cont if Necessary Per Protocol [[id number]] Resulted:  03/17/20 0710     Updated:  03/17/20 0711     Target HR (85%) 139 bpm      Max. Pred. HR (100%) 163 bpm     ECG 12 Lead [[id number]] Collected:  03/17/20 1000     Updated:  03/17/20 0958    Narrative:       Test Reason : Post-cath  Blood Pressure : **/** mmHG  Vent. Rate : 086 BPM     Atrial Rate : 086 BPM     P-R Int : 158 ms          QRS Dur : 094 ms      QT Int : 386 ms       P-R-T Axes : 062 -29 -09 degrees     QTc Int : 461 ms    Normal sinus rhythm  Possible Left atrial enlargement  Incomplete right bundle branch block  Nonspecific ST abnormality  Abnormal ECG  When compared with ECG of 17-MAR-2020  05:33, (Unconfirmed)  ST no longer elevated in Inferior leads  ST now depressed in Anterior leads  T wave inversion now evident in Inferior leads    Referred By:             Confirmed By:           Operative/Procedure Notes (last 24 hours) (Notes from 03/16/20 1114 through 03/17/20 1114)    No notes of this type exist for this encounter.         Physician Progress Notes (last 24 hours) (Notes from 03/16/20 1114 through 03/17/20 1114)    No notes of this type exist for this encounter.            Consult Notes (last 24 hours) (Notes from 03/16/20 1114 through 03/17/20 1114)      Bernardo Dixon MD at 03/17/20 0713            Referring Provider: Dr Grover   Reason for Consultation:  Non stemi     Patient Care Team:  Provider, No Known as PCP - General      History of present illness:     Middle-age gentleman who has been having pressure-like sensation in the central part of the chest which woke him up yesterday morning at 4:00.  Has not been able to get comfortable since then.  It is been coming on and off.  Went to work where he got nauseous and diaphoretic when he started doing any work.  He sat in the truck and avoided work yesterday.  Came back home continue to feel the same thing when he came into the emergency room at [facility] was noted to have abnormal troponin.  Patient subsequently has been transferred over.    Since transfer patient still continues to have some pressure-like sensation in the central part of the chest with some stomach upset feeling which comes and goes.  He has never had the symptoms in the past.      Review of Systems   Pertinent items are noted in HPI  Review of Systems      History    Baseline EKG: Sinus rhythm voltage criteria for left implant hypertrophy nonspecific ST wave changes.    LV function assessment: Pending.    CAD work-up: None.    Active nicotine abuse-1 to 2 packs/day.    COPD.    Arthritis.    GERD.    Medications none.    Allergies none.    Personal  "history: Smokes up to 1 to 2 packs/day no history of alcohol consumption functional status of the patient has been good works in house building.    Family history:    6 siblings 1 younger brother  of myocardial infarction.  Patient's mom has had stents in her heart.    Review of symptoms:    There is no cough cold fever sweats stroke weakness joint swelling rest of review of symptoms are negative.    No past surgical history on file., Family History   Problem Relation Age of Onset   • Heart disease Mother    • Cancer Father    • Cancer Brother    • Heart disease Brother    , Social History     Tobacco Use   • Smoking status: Current Some Day Smoker     Packs/day: 1.50     Years: 40.00     Pack years: 60.00   Substance Use Topics   • Alcohol use: Yes     Alcohol/week: 1.0 standard drinks     Types: 1 Glasses of wine per week     Comment: rare to drink etoh, hardly ever.   • Drug use: Never   , No medications prior to admission.   , Scheduled Meds:    atorvastatin 80 mg Oral Nightly   losartan 50 mg Oral Daily   metoprolol tartrate 25 mg Oral Q12H   sodium chloride 10 mL Intravenous Q12H   , Continuous Infusions:    heparin 1,000 Units/hr Last Rate: 1,000 Units/hr (20 0108)   , PRN Meds:  •  acetaminophen **OR** acetaminophen  •  Morphine **AND** naloxone  •  nitroglycerin  •  ondansetron  •  sodium chloride, Allergies:  Nyquil multi-symptom [pseudoeph-doxylamine-dm-apap] and Penicillins     OBJECTIVE:    Vital Sign Min/Max for last 24 hours  Temp  Min: 97.8 °F (36.6 °C)  Max: 98 °F (36.7 °C)   BP  Min: 100/81  Max: 127/90   Pulse  Min: 52  Max: 85   Resp  Min: 20  Max: 22   SpO2  Min: 91 %  Max: 95 %   No data recorded   Weight  Min: 91.1 kg (200 lb 14.4 oz)  Max: 91.1 kg (200 lb 14.4 oz)     Flowsheet Rows      First Filed Value   Admission Height  172.7 cm (68\") Documented at 2020 0002   Admission Weight  91.1 kg (200 lb 14.4 oz) Documented at 2020 0002             Physical Exam:     General " Appearance:    Alert, cooperative, in no acute distress   Head:    Normocephalic, without obvious abnormality, atraumatic   Eyes:            Lids and lashes normal, conjunctivae and sclerae normal, no   icterus, no pallor, corneas clear, PERRLA   Ears:    Ears appear intact with no abnormalities noted   Throat:   No oral lesions, no thrush, oral mucosa moist   Neck:   No adenopathy, supple, trachea midline, no thyromegaly, no   carotid bruit, no JVD   Back:     No kyphosis present, no scoliosis present, no skin lesions,      erythema or scars, no tenderness to percussion or                   palpation,   range of motion normal   Lungs:     Clear to auscultation,respirations regular, even and                  unlabored    Heart:    Regular rhythm and normal rate, normal S1 and S2, no            murmur, no gallop, no rub, no click   Chest Wall:    No abnormalities observed   Abdomen:     Normal bowel sounds, no masses, no organomegaly, soft        non-tender, non-distended, no guarding, no rebound                tenderness   Rectal:     Deferred   Extremities:   Moves all extremities well, no edema, no cyanosis, no             redness   Pulses:   Pulses palpable and equal bilaterally   Skin:   No bleeding, bruising or rash   Lymph nodes:   No palpable adenopathy   Neurologic:   Cranial nerves 2 - 12 grossly intact, sensation intact, DTR       present and equal bilaterally           LAB DATA :     Results from last 7 days   Lab Units 03/17/20  0529   CHOLESTEROL mg/dL 134   TRIGLYCERIDES mg/dL 43   HDL CHOL mg/dL 44   LDL CHOL mg/dL 81       WBC   Date Value Ref Range Status   03/17/2020 11.58 (H) 3.40 - 10.80 10*3/mm3 Final     RBC   Date Value Ref Range Status   03/17/2020 5.98 (H) 4.14 - 5.80 10*6/mm3 Final     Hemoglobin   Date Value Ref Range Status   03/17/2020 18.7 (H) 13.0 - 17.7 g/dL Final     Hematocrit   Date Value Ref Range Status   03/17/2020 56.8 (H) 37.5 - 51.0 % Final     MCV   Date Value Ref Range  Status   03/17/2020 95.0 79.0 - 97.0 fL Final     MCH   Date Value Ref Range Status   03/17/2020 31.3 26.6 - 33.0 pg Final     MCHC   Date Value Ref Range Status   03/17/2020 32.9 31.5 - 35.7 g/dL Final     RDW   Date Value Ref Range Status   03/17/2020 14.8 12.3 - 15.4 % Final     RDW-SD   Date Value Ref Range Status   03/17/2020 51.4 37.0 - 54.0 fl Final     MPV   Date Value Ref Range Status   03/17/2020 9.6 6.0 - 12.0 fL Final     Platelets   Date Value Ref Range Status   03/17/2020 267 140 - 450 10*3/mm3 Final     Neutrophil %   Date Value Ref Range Status   03/17/2020 64.6 42.7 - 76.0 % Final     Lymphocyte %   Date Value Ref Range Status   03/17/2020 24.9 19.6 - 45.3 % Final     Monocyte %   Date Value Ref Range Status   03/17/2020 7.0 5.0 - 12.0 % Final     Eosinophil %   Date Value Ref Range Status   03/17/2020 2.1 0.3 - 6.2 % Final     Basophil %   Date Value Ref Range Status   03/17/2020 1.0 0.0 - 1.5 % Final     Immature Grans %   Date Value Ref Range Status   03/17/2020 0.4 0.0 - 0.5 % Final     Neutrophils Absolute   Date Value Ref Range Status   03/17/2020 6.95 1.70 - 7.00 10*3/mm3 Final     Neutrophils, Absolute   Date Value Ref Range Status   03/17/2020 9.38 (H) 1.70 - 7.00 10*3/mm3 Final     Lymphocytes, Absolute   Date Value Ref Range Status   03/17/2020 3.62 (H) 0.70 - 3.10 10*3/mm3 Final     Monocytes, Absolute   Date Value Ref Range Status   03/17/2020 1.01 (H) 0.10 - 0.90 10*3/mm3 Final     Eosinophils Absolute   Date Value Ref Range Status   03/17/2020 0.35 0.00 - 0.40 10*3/mm3 Final     Eosinophils, Absolute   Date Value Ref Range Status   03/17/2020 0.30 0.00 - 0.40 10*3/mm3 Final     Basophils, Absolute   Date Value Ref Range Status   03/17/2020 0.15 0.00 - 0.20 10*3/mm3 Final     Immature Grans, Absolute   Date Value Ref Range Status   03/17/2020 0.06 (H) 0.00 - 0.05 10*3/mm3 Final     nRBC   Date Value Ref Range Status   03/17/2020 0.0 0.0 - 0.2 /100 WBC Final       Lab Results      Component Value Date    GLUCOSE 101 (H) 03/17/2020    BUN 5 (L) 03/17/2020    CREATININE 0.69 (L) 03/17/2020    EGFRIFNONA 118 03/17/2020    BCR 7.2 03/17/2020    CO2 20.7 (L) 03/17/2020    CALCIUM 9.2 03/17/2020       Lab Results   Component Value Date    TROPONINT 1.280 (C) 03/17/2020       No results found for: DDIMER    No results found for: SITE, ALLENTEST, PHART, UVY9FST, PO2ART, YIS7OQB, BASEEXCESS, W1SDGAGX, HGBBG, HCTABG, OXYHEMOGLOBI, METHHGBN, CARBOXYHGB, CO2CT, BAROMETRIC, MODALITY, FIO2  Lab Results   Component Value Date    HGBA1C 4.80 03/17/2020         No results found for: LIPASE    IMAGING DATA:     No results found.      DIAGNOSIS     #1 non-ST elevation myocardial infarction:  Patient has presented with a non-ST elevation myocardial infarction.  Has intermediate risk profile for coronary artery disease.  Enzymes are on the upward trend with continued symptoms.  Has been offered invasive evaluation he is in agreement with this plan risks and benefits have been explained.    2.  Nicotine abuse:    Needs to get off the cigarettes right away patient expresses understanding will be kept on nicotine patch during the hospital stay.    3.  Risk profile:  Will be evaluated for the same during the hospital stay and therapy adjusted accordingly.    Thank you for letting me take part in the care of Mr. Burt.          NSTEMI (non-ST elevated myocardial infarction) (CMS/[facility])          I discussed the patients findings and my recommendations with patient and family    Bernardo Dixon MD  03/17/20  07:13      Please note that portions of this note may have been completed with a voice recognition program. Efforts were made to edit the dictations, but occasionally words are mistranscribed.             Electronically signed by Bernardo Dixon MD at 03/17/20 0689

## 2020-03-17 NOTE — CONSULTS
Referring Provider: Dr Grover   Reason for Consultation:  Non stemi     Patient Care Team:  Provider, No Known as PCP - General      History of present illness:     Middle-age gentleman who has been having pressure-like sensation in the central part of the chest which woke him up yesterday morning at 4:00.  Has not been able to get comfortable since then.  It is been coming on and off.  Went to work where he got nauseous and diaphoretic when he started doing any work.  He sat in the truck and avoided work yesterday.  Came back home continue to feel the same thing when he came into the emergency room at [facility] was noted to have abnormal troponin.  Patient subsequently has been transferred over.    Since transfer patient still continues to have some pressure-like sensation in the central part of the chest with some stomach upset feeling which comes and goes.  He has never had the symptoms in the past.      Review of Systems   Pertinent items are noted in HPI  Review of Systems      History    Baseline EKG: Sinus rhythm voltage criteria for left implant hypertrophy nonspecific ST wave changes.    LV function assessment: Pending.    CAD work-up: None.    Active nicotine abuse-1 to 2 packs/day.    COPD.    Arthritis.    GERD.    Medications none.    Allergies none.    Personal history: Smokes up to 1 to 2 packs/day no history of alcohol consumption functional status of the patient has been good works in house building.    Family history:    6 siblings 1 younger brother  of myocardial infarction.  Patient's mom has had stents in her heart.    Review of symptoms:    There is no cough cold fever sweats stroke weakness joint swelling rest of review of symptoms are negative.    No past surgical history on file., Family History   Problem Relation Age of Onset   • Heart disease Mother    • Cancer Father    • Cancer Brother    • Heart disease Brother    , Social History     Tobacco Use   • Smoking status: Current  "Some Day Smoker     Packs/day: 1.50     Years: 40.00     Pack years: 60.00   Substance Use Topics   • Alcohol use: Yes     Alcohol/week: 1.0 standard drinks     Types: 1 Glasses of wine per week     Comment: rare to drink etoh, hardly ever.   • Drug use: Never   , No medications prior to admission.   , Scheduled Meds:    atorvastatin 80 mg Oral Nightly   losartan 50 mg Oral Daily   metoprolol tartrate 25 mg Oral Q12H   sodium chloride 10 mL Intravenous Q12H   , Continuous Infusions:    heparin 1,000 Units/hr Last Rate: 1,000 Units/hr (03/17/20 0108)   , PRN Meds:  •  acetaminophen **OR** acetaminophen  •  Morphine **AND** naloxone  •  nitroglycerin  •  ondansetron  •  sodium chloride, Allergies:  Nyquil multi-symptom [pseudoeph-doxylamine-dm-apap] and Penicillins     OBJECTIVE:    Vital Sign Min/Max for last 24 hours  Temp  Min: 97.8 °F (36.6 °C)  Max: 98 °F (36.7 °C)   BP  Min: 100/81  Max: 127/90   Pulse  Min: 52  Max: 85   Resp  Min: 20  Max: 22   SpO2  Min: 91 %  Max: 95 %   No data recorded   Weight  Min: 91.1 kg (200 lb 14.4 oz)  Max: 91.1 kg (200 lb 14.4 oz)     Flowsheet Rows      First Filed Value   Admission Height  172.7 cm (68\") Documented at 03/17/2020 0002   Admission Weight  91.1 kg (200 lb 14.4 oz) Documented at 03/17/2020 0002             Physical Exam:     General Appearance:    Alert, cooperative, in no acute distress   Head:    Normocephalic, without obvious abnormality, atraumatic   Eyes:            Lids and lashes normal, conjunctivae and sclerae normal, no   icterus, no pallor, corneas clear, PERRLA   Ears:    Ears appear intact with no abnormalities noted   Throat:   No oral lesions, no thrush, oral mucosa moist   Neck:   No adenopathy, supple, trachea midline, no thyromegaly, no   carotid bruit, no JVD   Back:     No kyphosis present, no scoliosis present, no skin lesions,      erythema or scars, no tenderness to percussion or                   palpation,   range of motion normal   Lungs:    "  Clear to auscultation,respirations regular, even and                  unlabored    Heart:    Regular rhythm and normal rate, normal S1 and S2, no            murmur, no gallop, no rub, no click   Chest Wall:    No abnormalities observed   Abdomen:     Normal bowel sounds, no masses, no organomegaly, soft        non-tender, non-distended, no guarding, no rebound                tenderness   Rectal:     Deferred   Extremities:   Moves all extremities well, no edema, no cyanosis, no             redness   Pulses:   Pulses palpable and equal bilaterally   Skin:   No bleeding, bruising or rash   Lymph nodes:   No palpable adenopathy   Neurologic:   Cranial nerves 2 - 12 grossly intact, sensation intact, DTR       present and equal bilaterally           LAB DATA :     Results from last 7 days   Lab Units 03/17/20  0529   CHOLESTEROL mg/dL 134   TRIGLYCERIDES mg/dL 43   HDL CHOL mg/dL 44   LDL CHOL mg/dL 81       WBC   Date Value Ref Range Status   03/17/2020 11.58 (H) 3.40 - 10.80 10*3/mm3 Final     RBC   Date Value Ref Range Status   03/17/2020 5.98 (H) 4.14 - 5.80 10*6/mm3 Final     Hemoglobin   Date Value Ref Range Status   03/17/2020 18.7 (H) 13.0 - 17.7 g/dL Final     Hematocrit   Date Value Ref Range Status   03/17/2020 56.8 (H) 37.5 - 51.0 % Final     MCV   Date Value Ref Range Status   03/17/2020 95.0 79.0 - 97.0 fL Final     MCH   Date Value Ref Range Status   03/17/2020 31.3 26.6 - 33.0 pg Final     MCHC   Date Value Ref Range Status   03/17/2020 32.9 31.5 - 35.7 g/dL Final     RDW   Date Value Ref Range Status   03/17/2020 14.8 12.3 - 15.4 % Final     RDW-SD   Date Value Ref Range Status   03/17/2020 51.4 37.0 - 54.0 fl Final     MPV   Date Value Ref Range Status   03/17/2020 9.6 6.0 - 12.0 fL Final     Platelets   Date Value Ref Range Status   03/17/2020 267 140 - 450 10*3/mm3 Final     Neutrophil %   Date Value Ref Range Status   03/17/2020 64.6 42.7 - 76.0 % Final     Lymphocyte %   Date Value Ref Range Status    03/17/2020 24.9 19.6 - 45.3 % Final     Monocyte %   Date Value Ref Range Status   03/17/2020 7.0 5.0 - 12.0 % Final     Eosinophil %   Date Value Ref Range Status   03/17/2020 2.1 0.3 - 6.2 % Final     Basophil %   Date Value Ref Range Status   03/17/2020 1.0 0.0 - 1.5 % Final     Immature Grans %   Date Value Ref Range Status   03/17/2020 0.4 0.0 - 0.5 % Final     Neutrophils Absolute   Date Value Ref Range Status   03/17/2020 6.95 1.70 - 7.00 10*3/mm3 Final     Neutrophils, Absolute   Date Value Ref Range Status   03/17/2020 9.38 (H) 1.70 - 7.00 10*3/mm3 Final     Lymphocytes, Absolute   Date Value Ref Range Status   03/17/2020 3.62 (H) 0.70 - 3.10 10*3/mm3 Final     Monocytes, Absolute   Date Value Ref Range Status   03/17/2020 1.01 (H) 0.10 - 0.90 10*3/mm3 Final     Eosinophils Absolute   Date Value Ref Range Status   03/17/2020 0.35 0.00 - 0.40 10*3/mm3 Final     Eosinophils, Absolute   Date Value Ref Range Status   03/17/2020 0.30 0.00 - 0.40 10*3/mm3 Final     Basophils, Absolute   Date Value Ref Range Status   03/17/2020 0.15 0.00 - 0.20 10*3/mm3 Final     Immature Grans, Absolute   Date Value Ref Range Status   03/17/2020 0.06 (H) 0.00 - 0.05 10*3/mm3 Final     nRBC   Date Value Ref Range Status   03/17/2020 0.0 0.0 - 0.2 /100 WBC Final       Lab Results   Component Value Date    GLUCOSE 101 (H) 03/17/2020    BUN 5 (L) 03/17/2020    CREATININE 0.69 (L) 03/17/2020    EGFRIFNONA 118 03/17/2020    BCR 7.2 03/17/2020    CO2 20.7 (L) 03/17/2020    CALCIUM 9.2 03/17/2020       Lab Results   Component Value Date    TROPONINT 1.280 (C) 03/17/2020       No results found for: DDIMER    No results found for: SITE, ALLENTEST, PHART, ROG8ZTM, PO2ART, KFX5IGN, BASEEXCESS, T6RONIZJ, HGBBG, HCTABG, OXYHEMOGLOBI, METHHGBN, CARBOXYHGB, CO2CT, BAROMETRIC, MODALITY, FIO2  Lab Results   Component Value Date    HGBA1C 4.80 03/17/2020         No results found for: LIPASE    IMAGING DATA:     No results found.      DIAGNOSIS      #1 non-ST elevation myocardial infarction:  Patient has presented with a non-ST elevation myocardial infarction.  Has intermediate risk profile for coronary artery disease.  Enzymes are on the upward trend with continued symptoms.  Has been offered invasive evaluation he is in agreement with this plan risks and benefits have been explained.    2.  Nicotine abuse:    Needs to get off the cigarettes right away patient expresses understanding will be kept on nicotine patch during the hospital stay.    3.  Risk profile:  Will be evaluated for the same during the hospital stay and therapy adjusted accordingly.    Thank you for letting me take part in the care of Mr. Burt.          NSTEMI (non-ST elevated myocardial infarction) (CMS/[facility])          I discussed the patients findings and my recommendations with patient and family    Bernardo Dixon MD  03/17/20  07:13      Please note that portions of this note may have been completed with a voice recognition program. Efforts were made to edit the dictations, but occasionally words are mistranscribed.

## 2020-03-17 NOTE — PLAN OF CARE
Problem: Patient Care Overview  Goal: Plan of Care Review  Outcome: Ongoing (interventions implemented as appropriate)     Vital signs stable. Will continue to monitor.

## 2020-03-17 NOTE — PROGRESS NOTES
Discharge Planning Assessment  [facility]     Patient Name: Flavio Haines  MRN: [id number]  Today's Date: 3/17/2020    Admit Date: 3/16/2020    Discharge Needs Assessment     Row Name 03/17/20 1256       Living Environment    Lives With  spouse    Name(s) of Who Lives With Patient  wife arsenio    Unique Family Situation  self employed works full time    Primary Care Provided by  self;spouse/significant other    Provides Primary Care For  spouse    Family Caregiver if Needed  spouse       Discharge Needs Assessment    Readmission Within the Last 30 Days  no previous admission in last 30 days    Concerns to be Addressed  discharge planning    Anticipated Changes Related to Illness  other (see comments)    Provided Post Acute Provider List?  N/A    N/A Provider List Comment  icu    Discharge Coordination/Progress  home with coupon for medication        Discharge Plan     Row Name 03/17/20 7021       Plan    Plan  Discharge plan, verified address and PCP. Lives with wife and works full time and is self employed . Not been sick, drives and is normally independent. Discussed medication access is wife. Will need Brilinta coupon.    Row Name 03/17/20 1306       Plan    Plan  Discharge plan,         Destination      Coordination has not been started for this encounter.      Durable Medical Equipment      Coordination has not been started for this encounter.      Dialysis/Infusion      Coordination has not been started for this encounter.      Home Medical Care      Coordination has not been started for this encounter.      Therapy      Coordination has not been started for this encounter.      Community Resources      Coordination has not been started for this encounter.        Expected Discharge Date and Time     Expected Discharge Date Expected Discharge Time    Mar 20, 2020         Demographic Summary     Row Name 03/17/20 1252       General Information    Admission Type  inpatient    Arrived From  home    Expected  Length of Stay (LOS)  3    Referral Source  admission list    Reason for Consult  discharge planning    Preferred Language  English       Contact Information    Contact Information Comments  wife arsenio        Functional Status     Row Name 03/17/20 5914       Functional Status, IADL    Medications  independent    Meal Preparation  independent    Housekeeping  independent    Laundry  independent    Shopping  independent    IADL Comments  self employed and works full time       Employment/    Employment Status  self-employed        Psychosocial    No documentation.       Abuse/Neglect    No documentation.       Legal    No documentation.       Substance Abuse    No documentation.       Patient Forms    No documentation.           Maci New, SULMAW

## 2020-03-17 NOTE — PROGRESS NOTES
Adult Nutrition  Assessment/PES    Patient Name:  Flavio Haines  YOB: 1965  MRN: [id number]  Admit Date:  3/16/2020    Assessment Date:  3/17/2020    Comments:    Recommend:  1. Continue current diet order as medically appropriate and tolerated.  2. Establish and encourage PO intake.  3. RD ordered Boost Plus daily.  4. Consider a multivitamin with minerals daily.     RD to follow pt and available PRN.      Reason for Assessment     Row Name 03/17/20 1419          Reason for Assessment    Reason For Assessment  diagnosis/disease state     Diagnosis  cardiac disease;substance use/abuse NSTEMI, Tobacco abuse     Identified At Risk by Screening Criteria  BMI             Labs/Tests/Procedures/Meds     Row Name 03/17/20 1420          Labs/Procedures/Meds    Lab Results Reviewed  reviewed, pertinent     Lab Results Comments  Low: Na+ BUN, Cr High: Gluc        Medications    Pertinent Medications Reviewed  reviewed         Physical Findings     Row Name 03/17/20 1420          Physical Findings    Overall Physical Appearance  obese         Estimated/Assessed Needs     Row Name 03/17/20 1421          Calculation Measurements    Weight Used For Calculations  74.1 kg (163 lb 4.9 oz) Adjusted BW        Estimated/Assessed Needs    Additional Documentation  Fluid Requirements (Group);Gallatin-St. Jeor Equation (Group);Protein Requirements (Group);Calorie Requirements (Group)        Calorie Requirements    Estimated Calorie Need Method  Gallatin-St Jeor     Estimated Calorie Requirement Comment  2005 - 2205 AF 1.3        Gallatin-St. Jeor Equation    RMR (Gallatin-St. Jeor Equation)  1540.25        Protein Requirements    Weight Used For Protein Calculations  74.1 kg (163 lb 4.9 oz) Adjusted BW     Est Protein Requirement Amount (gms/kg)  1.2 gm protein 75 - 88 gm     Estimated Protein Requirements (gms/day)  88.89        Fluid Requirements    Estimated Fluid Requirement Method  Rubén-Segar Formula      Rubén-Shane Method (over 20 kg)  2981.5         Nutrition Prescription Ordered     Row Name 03/17/20 1422          Nutrition Prescription PO    Current PO Diet  Regular     Common Modifiers  Cardiac         Evaluation of Received Nutrient/Fluid Intake     Row Name 03/17/20 1421          PO Evaluation    Number of Days PO Intake Evaluated  Insufficient Data               Problem/Interventions:  Problem 1     Row Name 03/17/20 1423          Nutrition Diagnoses Problem 1    Problem 1  Overweight/Obesity     Etiology (related to)  Factors Affecting Nutrition     Food Habit/Preferences  Large Meals     Signs/Symptoms (evidenced by)  BMI     BMI  30 - 34.9               Intervention Goal     Row Name 03/17/20 1423          Intervention Goal    General  Meet nutritional needs for age/condition     PO  Meet estimated needs;Establish PO;PO intake (%)     PO Intake %  50 %     Weight  No significant weight loss         Nutrition Intervention     Row Name 03/17/20 1423          Nutrition Intervention    RD/Tech Action  Follow Tx progress;Encourage intake;Recommend/ordered     Recommended/Ordered  Supplement         Nutrition Prescription     Row Name 03/17/20 1424          Nutrition Prescription PO    PO Prescription  Other (comment);Begin/change supplement Continue current diet order as medically appropriate and tolerated     Supplement  Boost Plus     Supplement Frequency  Daily     New PO Prescription Ordered?  No, recommended        Other Orders    Obtain Weight  Daily     Obtain Weight Ordered?  No, recommended     Supplement  Vitamin mineral supplement     Supplement Ordered?  No, recommended     Other  Continue to monitor and replace electrolytes PRN         Education/Evaluation     Row Name 03/17/20 1424          Education    Education  Education not appropriate at this time     Please explain  Defer until post ICU        Monitor/Evaluation    Monitor  Per protocol;I&O;PO intake;Supplement intake;Pertinent  labs;Weight;Skin status           Electronically signed by:  Angelia Grijalva RD  03/17/20 14:25

## 2020-03-17 NOTE — NURSING NOTE
Pt seen today for Phase I Cardiac Rehab consult. Benefits discussed, pt states he has an understanding he states he is not interested in the program at this time.

## 2020-03-17 NOTE — PROGRESS NOTES
[facility]    Name:  Flavio Haines   Age:  55 y.o.  Sex:  male  :  1965  MRN:  [id number]   Visit Number:  [id number]  Admission Date:  3/16/2020  Date Of Service:  20  Primary Care Physician:  Provider, No Known    Patient was seen and examined. Pertinent laboratory and radiology data were reviewed.  He is now status post left heart cath.  He received 3 stents to the LAD.  He was also found to have eccentric atherosclerotic plaque proximal RCA with stenosis up to 70% which is being treated medically.  During my encounter patient did report some chest pressure but rated it as a 2-3.  Patient was given morphine with some relief.  Extensive discussion with the patient regarding smoking cessation as he currently smokes about 2 packs of cigarettes a day.  Strong family history of early atherosclerotic disease also reported by patient.    Vital signs:    Vital Signs (last 24 hours)        0700  -   0659  0700  -   1402   Most Recent    Temp (°F) 97.8 -  98    97.7 -  97.8     97.7 (36.5)    Heart Rate 52 -  85    58 -  90     78    Resp 20 -  22      18     18    /81 -  127/90    106/67 -  149/97     140/106    SpO2 (%) 92 -  95    91 -  97     96          Patient is currently hemodynamically stable.  He remains on Integrilin drip which will be completed this afternoon.  Patient to be continued on aspirin, Brilinta, atorvastatin, Cozaar and metoprolol.  Nicotine patch in place.  Patient strongly encouraged to consider smoking cessation and he does appear to be interested.  We will we discussed the topic prior to discharge which will likely occur tomorrow a.m.  I advised the patient to report any further chest pain or pressure and asked nursing to relay it to Dr. Dixon if it persists.    Abelardo Woodward MD  20  14:02    Dictated utilizing Dragon dictation.

## 2020-03-18 ENCOUNTER — APPOINTMENT (OUTPATIENT)
Dept: CT IMAGING | Facility: HOSPITAL | Age: 55
End: 2020-03-18

## 2020-03-18 LAB
ANION GAP SERPL CALCULATED.3IONS-SCNC: 11.4 MMOL/L (ref 5–15)
BUN BLD-MCNC: 5 MG/DL (ref 6–20)
BUN/CREAT SERPL: 8.2 (ref 7–25)
CALCIUM SPEC-SCNC: 8.9 MG/DL (ref 8.6–10.5)
CHLORIDE SERPL-SCNC: 104 MMOL/L (ref 98–107)
CO2 SERPL-SCNC: 21.6 MMOL/L (ref 22–29)
CREAT BLD-MCNC: 0.61 MG/DL (ref 0.76–1.27)
DEPRECATED RDW RBC AUTO: 50.5 FL (ref 37–54)
ERYTHROCYTE [DISTWIDTH] IN BLOOD BY AUTOMATED COUNT: 14.7 % (ref 12.3–15.4)
GFR SERPL CREATININE-BSD FRML MDRD: 136 ML/MIN/1.73
GLUCOSE BLD-MCNC: 122 MG/DL (ref 65–99)
HCT VFR BLD AUTO: 54.3 % (ref 37.5–51)
HGB BLD-MCNC: 18.4 G/DL (ref 13–17.7)
MCH RBC QN AUTO: 31.9 PG (ref 26.6–33)
MCHC RBC AUTO-ENTMCNC: 33.9 G/DL (ref 31.5–35.7)
MCV RBC AUTO: 94.3 FL (ref 79–97)
PLATELET # BLD AUTO: 220 10*3/MM3 (ref 140–450)
PMV BLD AUTO: 9.6 FL (ref 6–12)
POTASSIUM BLD-SCNC: 4.1 MMOL/L (ref 3.5–5.2)
RBC # BLD AUTO: 5.76 10*6/MM3 (ref 4.14–5.8)
SODIUM BLD-SCNC: 137 MMOL/L (ref 136–145)
TROPONIN T SERPL-MCNC: 2.62 NG/ML (ref 0–0.03)
WBC NRBC COR # BLD: 12.68 10*3/MM3 (ref 3.4–10.8)

## 2020-03-18 PROCEDURE — 99232 SBSQ HOSP IP/OBS MODERATE 35: CPT | Performed by: INTERNAL MEDICINE

## 2020-03-18 PROCEDURE — 25010000002 ENOXAPARIN PER 10 MG: Performed by: INTERNAL MEDICINE

## 2020-03-18 PROCEDURE — 80048 BASIC METABOLIC PNL TOTAL CA: CPT | Performed by: INTERNAL MEDICINE

## 2020-03-18 PROCEDURE — 85027 COMPLETE CBC AUTOMATED: CPT | Performed by: INTERNAL MEDICINE

## 2020-03-18 PROCEDURE — 93005 ELECTROCARDIOGRAM TRACING: CPT | Performed by: INTERNAL MEDICINE

## 2020-03-18 PROCEDURE — 25010000002 IOPAMIDOL 61 % SOLUTION: Performed by: INTERNAL MEDICINE

## 2020-03-18 PROCEDURE — 84484 ASSAY OF TROPONIN QUANT: CPT | Performed by: INTERNAL MEDICINE

## 2020-03-18 PROCEDURE — 71275 CT ANGIOGRAPHY CHEST: CPT

## 2020-03-18 PROCEDURE — 82668 ASSAY OF ERYTHROPOIETIN: CPT | Performed by: INTERNAL MEDICINE

## 2020-03-18 PROCEDURE — 85060 BLOOD SMEAR INTERPRETATION: CPT | Performed by: INTERNAL MEDICINE

## 2020-03-18 RX ORDER — FAMOTIDINE 20 MG/1
20 TABLET, FILM COATED ORAL
Status: DISCONTINUED | OUTPATIENT
Start: 2020-03-18 | End: 2020-03-19 | Stop reason: HOSPADM

## 2020-03-18 RX ORDER — ALUMINA, MAGNESIA, AND SIMETHICONE 2400; 2400; 240 MG/30ML; MG/30ML; MG/30ML
15 SUSPENSION ORAL EVERY 6 HOURS PRN
Status: DISCONTINUED | OUTPATIENT
Start: 2020-03-18 | End: 2020-03-19 | Stop reason: HOSPADM

## 2020-03-18 RX ORDER — FLUTICASONE PROPIONATE 50 MCG
2 SPRAY, SUSPENSION (ML) NASAL DAILY
Status: DISCONTINUED | OUTPATIENT
Start: 2020-03-18 | End: 2020-03-19 | Stop reason: HOSPADM

## 2020-03-18 RX ADMIN — TICAGRELOR 90 MG: 90 TABLET ORAL at 20:15

## 2020-03-18 RX ADMIN — SODIUM CHLORIDE, PRESERVATIVE FREE 10 ML: 5 INJECTION INTRAVENOUS at 20:15

## 2020-03-18 RX ADMIN — HYDROCODONE BITARTRATE AND ACETAMINOPHEN 1 TABLET: 5; 325 TABLET ORAL at 08:24

## 2020-03-18 RX ADMIN — ENOXAPARIN SODIUM 40 MG: 40 INJECTION SUBCUTANEOUS at 09:07

## 2020-03-18 RX ADMIN — ATORVASTATIN CALCIUM 80 MG: 80 TABLET, FILM COATED ORAL at 20:14

## 2020-03-18 RX ADMIN — FAMOTIDINE 20 MG: 20 TABLET, FILM COATED ORAL at 17:05

## 2020-03-18 RX ADMIN — NICOTINE 1 PATCH: 21 PATCH TRANSDERMAL at 08:25

## 2020-03-18 RX ADMIN — ASPIRIN 81 MG: 81 TABLET, COATED ORAL at 08:22

## 2020-03-18 RX ADMIN — HYDROCODONE BITARTRATE AND ACETAMINOPHEN 1 TABLET: 5; 325 TABLET ORAL at 03:47

## 2020-03-18 RX ADMIN — LOSARTAN POTASSIUM 50 MG: 50 TABLET, FILM COATED ORAL at 08:23

## 2020-03-18 RX ADMIN — IOPAMIDOL 100 ML: 612 INJECTION, SOLUTION INTRAVENOUS at 11:15

## 2020-03-18 RX ADMIN — ALPRAZOLAM 0.5 MG: 0.5 TABLET ORAL at 04:29

## 2020-03-18 RX ADMIN — ALPRAZOLAM 0.5 MG: 0.5 TABLET ORAL at 09:07

## 2020-03-18 RX ADMIN — ALPRAZOLAM 0.5 MG: 0.5 TABLET ORAL at 17:04

## 2020-03-18 RX ADMIN — METOPROLOL SUCCINATE 25 MG: 25 TABLET, EXTENDED RELEASE ORAL at 08:24

## 2020-03-18 RX ADMIN — SODIUM CHLORIDE, PRESERVATIVE FREE 10 ML: 5 INJECTION INTRAVENOUS at 08:25

## 2020-03-18 RX ADMIN — FLUTICASONE PROPIONATE 2 SPRAY: 50 SPRAY, METERED NASAL at 14:23

## 2020-03-18 RX ADMIN — TICAGRELOR 90 MG: 90 TABLET ORAL at 08:23

## 2020-03-18 RX ADMIN — ALUMINUM HYDROXIDE, MAGNESIUM HYDROXIDE, AND DIMETHICONE 15 ML: 400; 400; 40 SUSPENSION ORAL at 11:56

## 2020-03-18 NOTE — PROGRESS NOTES
"Hospitalist Progress Note.    LOS: 2 days    Patient Care Team:  Provider, No Known as PCP - General    Chief Complaint:   Follow-up on NSTEMI    SUBJECTIVE:  Patient seen and examined this morning.  Events from last night noted.  He reports no chest pain/pressure this morning. Feels well. Denies any soa, fever or chills. No events noted on telemetry. Labs reviewed, bump in troponin noted. Noted that the patient has elevated Hb/Hct and RBCs. He denies any history of sleep apnea, flushing or itching. Reports history of blood disorder in younger brother who  at 15 from complications of acute leukemia.     Based on 2-D echo, showing cor pulmonale, Dr. Dixon did order a CTA chest to rule out PE which was negative. Patient reports being motivated to quit cigarette smoking.     Review of Systems:    The pertinent  ROS was done and it is noted above, rest  was negative.    OBJECTIVE:    Vital Signs  /79 (BP Location: Right arm, Patient Position: Lying)   Pulse 74   Temp 98.1 °F (36.7 °C) (Oral)   Resp 18   Ht 172.7 cm (68\")   Wt 91.1 kg (200 lb 14.4 oz)   SpO2 93%   BMI 30.55 kg/m²       I/O this shift:  In: 759 [P.O.:360; I.V.:399]  Out: 925 [Urine:925]    Intake/Output Summary (Last 24 hours) at 3/18/2020 1427  Last data filed at 3/18/2020 1300  Gross per 24 hour   Intake 3378 ml   Output 2225 ml   Net 1153 ml       Physical Exam:    General Appearance: alert, oriented x 3, no acute distress, pleasant  HEENT: pupils round and reactive to light, oral mucosa dry, extraocular movements intact.  Neck: supple, no JVD, trachea midline  Lungs: Clear to Auscultation, unlabored breathing effort  Heart: RRR, normal S1 and S2, no S3, no rub  Abdomen: soft, non-tender, no palpable bladder, present bowel sounds to auscultation  Extremities: no edema, no cyanosis, + clubbing.   Neuro: normal speech and mental status, grossly non focal.     Results Review:    Results from last 7 days   Lab Units 20  0424 " 03/17/20  0529   SODIUM mmol/L 137 134*   POTASSIUM mmol/L 4.1 4.2   CHLORIDE mmol/L 104 101   CO2 mmol/L 21.6* 20.7*   BUN mg/dL 5* 5*   CREATININE mg/dL 0.61* 0.69*   CALCIUM mg/dL 8.9 9.2   GLUCOSE mg/dL 122* 101*       Estimated Creatinine Clearance: 146.5 mL/min (A) (by C-G formula based on SCr of 0.61 mg/dL (L)).                Results from last 7 days   Lab Units 03/18/20  0424 03/17/20  0529 03/17/20  0050   WBC 10*3/mm3 12.68* 11.58* 14.52*   HEMOGLOBIN g/dL 18.4* 18.7* 19.1*   PLATELETS 10*3/mm3 220 267 290       Results from last 7 days   Lab Units 03/17/20  0050   INR  1.10         Imaging Results (Last 24 Hours)     Procedure Component Value Units Date/Time    CT Chest Pulmonary Embolism [[id number]] Collected:  03/18/20 1043     Updated:  03/18/20 1046    Narrative:       PROCEDURE: CT CHEST PULMONARY EMBOLISM-     HISTORY: Chest pain, acute, PE suspected, high pretest prob;  I21.4-Non-ST elevation (NSTEMI) myocardial infarction     COMPARISON: None .     TECHNIQUE: Multiple axial CT images were obtained from the thoracic  inlet through the upper abdomen following the administration of Isovue  300 per the CT PE protocol. Coronal and oblique 3D MIP images were  reconstructed from the original axial data set.      FINDINGS: There are no filling defects within the pulmonary arteries to  suggest an embolus. The thoracic aorta is normal in caliber with no  evidence of dissection. The heart is normal in size. There are no  pleural or pericardial effusions. There is no adenopathy. Lung windows  reveal interlobular septal thickening consistent with fibrotic changes.  No discrete opacities are identified. The visualized upper abdomen is  unremarkable. Bone windows reveal no acute osseous abnormalities.       Impression:       No evidence of pulmonary embolus or dissection.            465.62 mGy.cm        This study was performed with techniques to keep radiation doses as low  as reasonably achievable (ALARA).  Individualized dose reduction  techniques using automated exposure control or adjustment of mA and/or  kV according to the patient size were employed.      This report was finalized on 3/18/2020 10:44 AM by Rebekah Tinoco M.D..          aspirin 81 mg Oral Daily   atorvastatin 80 mg Oral Nightly   enoxaparin 40 mg Subcutaneous Q24H   famotidine 20 mg Oral BID AC   fluticasone 2 spray Each Nare Daily   losartan 50 mg Oral Daily   metoprolol succinate XL 25 mg Oral Q24H   nicotine 1 patch Transdermal Q24H   sodium chloride 10 mL Intravenous Q12H   ticagrelor 90 mg Oral BID          Medication Review:   Current Facility-Administered Medications   Medication Dose Route Frequency Provider Last Rate Last Dose   • acetaminophen (TYLENOL) tablet 650 mg  650 mg Oral Q4H PRN Bernardo Dixon MD        Or   • acetaminophen (TYLENOL) suppository 650 mg  650 mg Rectal Q4H PRN Bernardo Dixon MD       • ALPRAZolam (XANAX) tablet 0.5 mg  0.5 mg Oral TID PRN Bernardo Dixon MD   0.5 mg at 03/18/20 0907   • aluminum-magnesium hydroxide-simethicone (MAALOX MAX) 400-400-40 MG/5ML suspension 15 mL  15 mL Oral Q6H PRN Abelardo Woodward MD   15 mL at 03/18/20 1156   • aspirin EC tablet 81 mg  81 mg Oral Daily Bernardo Dixon MD   81 mg at 03/18/20 0822   • atorvastatin (LIPITOR) tablet 80 mg  80 mg Oral Nightly Bernardo Dixon MD   80 mg at 03/17/20 2130   • enoxaparin (LOVENOX) syringe 40 mg  40 mg Subcutaneous Q24H Bernardo Dixon MD   40 mg at 03/18/20 0907   • famotidine (PEPCID) tablet 20 mg  20 mg Oral BID AC Abelardo Woodward MD       • fluticasone (FLONASE) 50 MCG/ACT nasal spray 2 spray  2 spray Each Nare Daily Abelardo Woodward MD   2 spray at 03/18/20 1423   • HYDROcodone-acetaminophen (NORCO) 5-325 MG per tablet 1 tablet  1 tablet Oral Q4H PRN Bernardo Dixon MD   1 tablet at 03/18/20 0824   • losartan (COZAAR) tablet 50 mg  50 mg Oral Daily Bernardo Dixon  MD Zachariah   50 mg at 03/18/20 0823   • metoprolol succinate XL (TOPROL-XL) 24 hr tablet 25 mg  25 mg Oral Q24H Bernardo Dixon MD   25 mg at 03/18/20 0824   • Morphine sulfate (PF) injection 2 mg  2 mg Intravenous Q4H PRN Bernardo Dixon MD   2 mg at 03/17/20 2253    And   • naloxone (NARCAN) injection 0.4 mg  0.4 mg Intravenous Q5 Min PRN Bernardo Dixon MD       • nicotine (NICODERM CQ) 21 MG/24HR patch 1 patch  1 patch Transdermal Q24H Bernardo Dixon MD   1 patch at 03/18/20 0825   • nitroglycerin (NITROSTAT) SL tablet 0.4 mg  0.4 mg Sublingual Q5 Min PRN Bernardo Dixon MD   0.4 mg at 03/17/20 0507   • ondansetron (ZOFRAN) injection 4 mg  4 mg Intravenous Q6H PRN Bernardo Dixon MD       • sodium chloride 0.9 % flush 10 mL  10 mL Intravenous Q12H Bernardo Dixon MD   10 mL at 03/18/20 0825   • sodium chloride 0.9 % flush 10 mL  10 mL Intravenous PRN Bernardo Dixon MD   10 mL at 03/17/20 0019   • ticagrelor (BRILINTA) tablet 90 mg  90 mg Oral BID Bernardo Dixon MD   90 mg at 03/18/20 0823       ASSESSMENT/PLAN:    NSTEMI (non-ST elevated myocardial infarction) (CMS/HCC)    1. NSTEMI, POA  2. Polycythemia, primary vs. Secondary  3. Chronic heavy tobacco use  4. Cor pulmonale, unclear etiology  5. Chronic essential hypertension    Patient is hemodynamically stable. CTA chest was negative for PE earlier today. No acute events noted on telemetry. Will continue with aspirin, Brilinta, metoprolol and losartan.  He will be continued on atorvastatin and thinks that he does have normal but panel.    Review of his labs revealed elevated RBCs, hemoglobin and hematocrit.  On exam he does have significant clubbing of bilateral hands.  I suspect that he may have secondary polycythemia in the setting of heavy tobacco use however polycythemia vera is a possibility.  We will check a peripheral smear and EPO levels.  Patient appears to be  motivated to quit cigarette smoking.  I did advise that if the EPO levels and peripheral smear are normal but he continues to have elevated hemoglobin and hematocrit despite nicotine cessation then he may need further work-up for polycythemia vera.  He verbalized understanding of days.    Nicotine patch in place.  Lovenox for DVT prophylaxis.  Anticipate discharge tomorrow a.m.  Details were discussed with the patient as well as family in the room.   I also discussed the details with the nursing staff.    Rest as ordered.    Abelardo Woodward MD  03/18/20  14:27    Please note that portions of this note may have been completed with a voice recognition program. Efforts were made to edit the dictations, but occasionally words are mistranscribed.

## 2020-03-18 NOTE — PROGRESS NOTES
"   LOS: 2 days   Patient Care Team:  Provider, No Known as PCP - General      Interval History:     Patient has been feeling well has no new symptoms at this time the chest pain is completely resolved.  Has had a good night sleep.    Review of Systems:   Pertinent items are noted in HPI.      OBJECTIVE:    Vital Sign Min/Max for last 24 hours  Temp  Min: 97.7 °F (36.5 °C)  Max: 98 °F (36.7 °C)   BP  Min: 112/77  Max: 149/97   Pulse  Min: 52  Max: 90   Resp  Min: 20  Max: 23   SpO2  Min: 92 %  Max: 99 %   Flow (L/min)  Min: 2  Max: 4   No data recorded     Flowsheet Rows      First Filed Value   Admission Height  172.7 cm (68\") Documented at 03/17/2020 0002   Admission Weight  91.1 kg (200 lb 14.4 oz) Documented at 03/17/2020 0002          Physical Exam:     General Appearance:    Alert, cooperative, in no acute distress   Head:    Normocephalic, without obvious abnormality, atraumatic   Eyes:            Lids and lashes normal, conjunctivae and sclerae normal, no   icterus, no pallor, corneas clear, PERRLA   Ears:    Ears appear intact with no abnormalities noted   Throat:   No oral lesions, no thrush, oral mucosa moist   Neck:   No adenopathy, supple, trachea midline, no thyromegaly, no   carotid bruit, no JVD   Back:     No kyphosis present, no scoliosis present, no skin lesions,      erythema or scars, no tenderness to percussion or                   palpation,   range of motion normal   Lungs:     Clear to auscultation,respirations regular, even and                  unlabored    Heart:    Regular rhythm and normal rate, normal S1 and S2, no            murmur, no gallop, no rub, no click   Chest Wall:    No abnormalities observed   Abdomen:     Normal bowel sounds, no masses, no organomegaly, soft        non-tender, non-distended, no guarding, no rebound                tenderness   Rectal:     Deferred   Extremities:   Moves all extremities well, no edema, no cyanosis, no             redness   Pulses:   Pulses " palpable and equal bilaterally   Skin:   No bleeding, bruising or rash   Lymph nodes:   No palpable adenopathy   Neurologic:   Cranial nerves 2 - 12 grossly intact, sensation intact, DTR       present and equal bilaterally        EKG: Sinus rhythm septal infarct nonspecific ST wave changes no acute ischemic changes.    LAB DATA :     Results from last 7 days   Lab Units 03/17/20  0529   CHOLESTEROL mg/dL 134   TRIGLYCERIDES mg/dL 43   HDL CHOL mg/dL 44   LDL CHOL mg/dL 81       Laboratory results:    Results from last 7 days   Lab Units 03/18/20  0424 03/17/20  0529   SODIUM mmol/L 137 134*   POTASSIUM mmol/L 4.1 4.2   CHLORIDE mmol/L 104 101   CO2 mmol/L 21.6* 20.7*   BUN mg/dL 5* 5*   CREATININE mg/dL 0.61* 0.69*   CALCIUM mg/dL 8.9 9.2   GLUCOSE mg/dL 122* 101*     Results from last 7 days   Lab Units 03/18/20  0424 03/17/20  0529 03/17/20  0050   WBC 10*3/mm3 12.68* 11.58* 14.52*   HEMOGLOBIN g/dL 18.4* 18.7* 19.1*   HEMATOCRIT % 54.3* 56.8* 57.3*   PLATELETS 10*3/mm3 220 267 290     Results from last 7 days   Lab Units 03/17/20  0050   INR  1.10             Results from last 7 days   Lab Units 03/18/20  0424 03/17/20  1307   CK TOTAL U/L  --  997*   CKMB ng/mL  --  168.60*   TROPONIN T ng/mL 2.620* 1.540*                 Lab Results   Component Value Date    HGBA1C 4.80 03/17/2020               IMAGING DATA:     No results found.        ASSESSMENT PLAN:    #1 non-ST elevation myocardial infarction:  Status post intervention to the LAD mid and distal.  Loss of the diagonal during the process.  The enzyme bump noted postprocedure.  Continue medical therapy for now.    2.  Coronary artery disease:  Has concomitant left main distal disease 30 to 50% in severity FFR of 0.86.  Explained the importance of this and absolute necessity secondary prevention.  Patient expresses understanding.  Has concomitant 70% stenosis of a large caliber dominant RCA in the proximal section.  It may be worth intervened on an elective  basis post discharge.    3.  Dyslipidemia:    Work-up has been negative which is intriguing.  Continue high-dose statin therapy for now nevertheless.    4.  Active nicotine abuse:  Needs to quit the cigarettes    He expresses understanding on nicotine patch during the hospital stay.    5.  Cor pulmonale:  Significantly dilated RV noted.  Will need evaluation for pulmonary embolism chronic, subacute.  Will obtain a CT PE protocol at this time.  This would make decisions with respect to anticoagulation therapy.    6.  Hypertension:  Blood pressures seem to be doing very well on the present medications no changes made.    Plan on transferring him out of the telemetry bed and ambulate and if doing well discharge later this evening or in a.m.      NSTEMI (non-ST elevated myocardial infarction) (CMS/[facility])            Bernardo Dixon MD  03/18/20  08:34

## 2020-03-18 NOTE — PLAN OF CARE
Problem: Patient Care Overview  Goal: Plan of Care Review  Outcome: Ongoing (interventions implemented as appropriate)  Flowsheets (Taken 3/18/2020 4256)  Progress: improving  Plan of Care Reviewed With: patient  Note:   Pt was able to come to the floor from ICU.  Pt ambulated in the hallway.

## 2020-03-18 NOTE — PLAN OF CARE
Problem: Patient Care Overview  Goal: Plan of Care Review  Outcome: Outcome(s) achieved     Problem: Cardiac: ACS (Acute Coronary Syndrome) (Adult)  Goal: Signs and Symptoms of Listed Potential Problems Will be Absent, Minimized or Managed (Cardiac: ACS)  Outcome: Outcome(s) achieved     Problem: Skin Injury Risk (Adult)  Goal: Identify Related Risk Factors and Signs and Symptoms  Outcome: Outcome(s) achieved     Problem: Skin Injury Risk (Adult)  Goal: Skin Health and Integrity  Outcome: Outcome(s) achieved   Sinus on monitor/no ectopy noted overnight.  Tolerating tx plan.

## 2020-03-19 VITALS
DIASTOLIC BLOOD PRESSURE: 85 MMHG | BODY MASS INDEX: 30.77 KG/M2 | RESPIRATION RATE: 19 BRPM | TEMPERATURE: 97.6 F | SYSTOLIC BLOOD PRESSURE: 105 MMHG | HEIGHT: 68 IN | WEIGHT: 203.04 LBS | HEART RATE: 75 BPM | OXYGEN SATURATION: 92 %

## 2020-03-19 LAB
ALBUMIN SERPL-MCNC: 3.4 G/DL (ref 3.5–5.2)
ANION GAP SERPL CALCULATED.3IONS-SCNC: 13.5 MMOL/L (ref 5–15)
BASOPHILS # BLD AUTO: 0.1 10*3/MM3 (ref 0–0.2)
BASOPHILS NFR BLD AUTO: 0.7 % (ref 0–1.5)
BUN BLD-MCNC: 7 MG/DL (ref 6–20)
BUN/CREAT SERPL: 11.5 (ref 7–25)
CALCIUM SPEC-SCNC: 8.9 MG/DL (ref 8.6–10.5)
CHLORIDE SERPL-SCNC: 101 MMOL/L (ref 98–107)
CO2 SERPL-SCNC: 19.5 MMOL/L (ref 22–29)
CREAT BLD-MCNC: 0.61 MG/DL (ref 0.76–1.27)
DEPRECATED RDW RBC AUTO: 52 FL (ref 37–54)
EOSINOPHIL # BLD AUTO: 0.37 10*3/MM3 (ref 0–0.4)
EOSINOPHIL NFR BLD AUTO: 2.8 % (ref 0.3–6.2)
ERYTHROCYTE [DISTWIDTH] IN BLOOD BY AUTOMATED COUNT: 14.7 % (ref 12.3–15.4)
ERYTHROCYTE [SEDIMENTATION RATE] IN BLOOD: 6 MM/HR (ref 0–15)
ETHNIC BACKGROUND STATED: 33.3 MIU/ML (ref 2.6–18.5)
GFR SERPL CREATININE-BSD FRML MDRD: 136 ML/MIN/1.73
GLUCOSE BLD-MCNC: 87 MG/DL (ref 65–99)
HCT VFR BLD AUTO: 58.6 % (ref 37.5–51)
HGB BLD-MCNC: 19.2 G/DL (ref 13–17.7)
IMM GRANULOCYTES # BLD AUTO: 0.1 10*3/MM3 (ref 0–0.05)
IMM GRANULOCYTES NFR BLD AUTO: 0.7 % (ref 0–0.5)
LPA SERPL-MCNC: <8.4 NMOL/L
LYMPHOCYTES # BLD AUTO: 3.07 10*3/MM3 (ref 0.7–3.1)
LYMPHOCYTES NFR BLD AUTO: 23 % (ref 19.6–45.3)
MCH RBC QN AUTO: 31.2 PG (ref 26.6–33)
MCHC RBC AUTO-ENTMCNC: 32.8 G/DL (ref 31.5–35.7)
MCV RBC AUTO: 95.3 FL (ref 79–97)
MONOCYTES # BLD AUTO: 1.56 10*3/MM3 (ref 0.1–0.9)
MONOCYTES NFR BLD AUTO: 11.7 % (ref 5–12)
NEUTROPHILS # BLD AUTO: 8.17 10*3/MM3 (ref 1.7–7)
NEUTROPHILS NFR BLD AUTO: 61.1 % (ref 42.7–76)
NRBC BLD AUTO-RTO: 0 /100 WBC (ref 0–0.2)
PHOSPHATE SERPL-MCNC: 2.2 MG/DL (ref 2.5–4.5)
PLATELET # BLD AUTO: 257 10*3/MM3 (ref 140–450)
PMV BLD AUTO: 9.7 FL (ref 6–12)
POTASSIUM BLD-SCNC: 3.9 MMOL/L (ref 3.5–5.2)
RBC # BLD AUTO: 6.15 10*6/MM3 (ref 4.14–5.8)
SODIUM BLD-SCNC: 134 MMOL/L (ref 136–145)
WBC NRBC COR # BLD: 13.37 10*3/MM3 (ref 3.4–10.8)

## 2020-03-19 PROCEDURE — 85651 RBC SED RATE NONAUTOMATED: CPT | Performed by: INTERNAL MEDICINE

## 2020-03-19 PROCEDURE — 80069 RENAL FUNCTION PANEL: CPT | Performed by: INTERNAL MEDICINE

## 2020-03-19 PROCEDURE — 85025 COMPLETE CBC W/AUTO DIFF WBC: CPT | Performed by: INTERNAL MEDICINE

## 2020-03-19 PROCEDURE — 99238 HOSP IP/OBS DSCHRG MGMT 30/<: CPT | Performed by: INTERNAL MEDICINE

## 2020-03-19 PROCEDURE — 94618 PULMONARY STRESS TESTING: CPT

## 2020-03-19 RX ORDER — FLUTICASONE PROPIONATE 50 MCG
2 SPRAY, SUSPENSION (ML) NASAL DAILY
Qty: 15.8 ML | Refills: 0 | Status: SHIPPED | OUTPATIENT
Start: 2020-03-20 | End: 2020-04-14 | Stop reason: SDUPTHER

## 2020-03-19 RX ORDER — FAMOTIDINE 40 MG/1
20 TABLET, FILM COATED ORAL
Qty: 30 TABLET | Refills: 0 | Status: SHIPPED | OUTPATIENT
Start: 2020-03-19 | End: 2020-03-31 | Stop reason: SDUPTHER

## 2020-03-19 RX ORDER — NITROGLYCERIN 0.4 MG/1
0.4 TABLET SUBLINGUAL
Qty: 25 TABLET | Refills: 0 | Status: SHIPPED | OUTPATIENT
Start: 2020-03-19

## 2020-03-19 RX ORDER — ALBUTEROL SULFATE 90 UG/1
2 AEROSOL, METERED RESPIRATORY (INHALATION) EVERY 4 HOURS PRN
Qty: 18 G | Refills: 0 | Status: SHIPPED | OUTPATIENT
Start: 2020-03-19 | End: 2020-04-14 | Stop reason: SDUPTHER

## 2020-03-19 RX ORDER — METOPROLOL SUCCINATE 25 MG/1
25 TABLET, EXTENDED RELEASE ORAL
Qty: 30 TABLET | Refills: 2 | Status: SHIPPED | OUTPATIENT
Start: 2020-03-20 | End: 2020-04-14 | Stop reason: SDUPTHER

## 2020-03-19 RX ORDER — ATORVASTATIN CALCIUM 80 MG/1
80 TABLET, FILM COATED ORAL NIGHTLY
Qty: 30 TABLET | Refills: 2 | Status: SHIPPED | OUTPATIENT
Start: 2020-03-19 | End: 2020-04-14 | Stop reason: SDUPTHER

## 2020-03-19 RX ORDER — LOSARTAN POTASSIUM 50 MG/1
50 TABLET ORAL DAILY
Qty: 30 TABLET | Refills: 2 | Status: SHIPPED | OUTPATIENT
Start: 2020-03-20 | End: 2020-04-14 | Stop reason: SDUPTHER

## 2020-03-19 RX ORDER — NICOTINE 21 MG/24HR
1 PATCH, TRANSDERMAL 24 HOURS TRANSDERMAL EVERY 24 HOURS
Qty: 28 PATCH | Refills: 0 | Status: SHIPPED | OUTPATIENT
Start: 2020-03-20 | End: 2020-04-01

## 2020-03-19 RX ORDER — ASPIRIN 81 MG/1
81 TABLET ORAL DAILY
Qty: 30 TABLET | Refills: 2 | Status: SHIPPED | OUTPATIENT
Start: 2020-03-20 | End: 2020-04-14 | Stop reason: SDUPTHER

## 2020-03-19 RX ADMIN — LOSARTAN POTASSIUM 50 MG: 50 TABLET, FILM COATED ORAL at 08:46

## 2020-03-19 RX ADMIN — RIVAROXABAN 2.5 MG: 2.5 TABLET, FILM COATED ORAL at 09:41

## 2020-03-19 RX ADMIN — TICAGRELOR 90 MG: 90 TABLET ORAL at 08:46

## 2020-03-19 RX ADMIN — FLUTICASONE PROPIONATE 2 SPRAY: 50 SPRAY, METERED NASAL at 08:50

## 2020-03-19 RX ADMIN — METOPROLOL SUCCINATE 25 MG: 25 TABLET, EXTENDED RELEASE ORAL at 08:46

## 2020-03-19 RX ADMIN — ASPIRIN 81 MG: 81 TABLET, COATED ORAL at 08:46

## 2020-03-19 RX ADMIN — SODIUM CHLORIDE, PRESERVATIVE FREE 10 ML: 5 INJECTION INTRAVENOUS at 08:49

## 2020-03-19 RX ADMIN — NICOTINE 1 PATCH: 21 PATCH TRANSDERMAL at 08:49

## 2020-03-19 RX ADMIN — FAMOTIDINE 20 MG: 20 TABLET, FILM COATED ORAL at 06:41

## 2020-03-19 NOTE — PROGRESS NOTES
Exercise Oximetry    Patient Name:Flavio Haines   MRN: [id number]   Date: 03/19/20             ROOM AIR BASELINE   SpO2% 91   Heart Rate 92   Blood Pressure      EXERCISE ON ROOM AIR SpO2% EXERCISE ON O2 @  LPM SpO2%   1 MINUTE 92 1 MINUTE    2 MINUTES 92 2 MINUTES    3 MINUTES 91 3 MINUTES    4 MINUTES 90 4 MINUTES    5 MINUTES 92 5 MINUTES    6 MINUTES 92 6 MINUTES               Distance Walked  250 Distance Walked   Dyspnea (Noel Scale)  3 Dyspnea (Noel Scale)   Fatigue (Noel Scale)  3 Fatigue (Noel Scale)   SpO2% Post Exercise  90 SpO2% Post Exercise   HR Post Exercise  89 HR Post Exercise   Time to Recovery  2 Time to Recovery     Comments: Patient walked in hallway at steady pase. Good patient effort. No O2 needed with ambulation.

## 2020-03-19 NOTE — PROGRESS NOTES
"   LOS: 3 days   Patient Care Team:  Provider, No Known as PCP - General        Interval History:     Patient doing reasonably well.  Is eager to go home.  Does not want anything done with respect to the RCA.    Review of Systems:   Pertinent items are noted in HPI.      OBJECTIVE:    Vital Sign Min/Max for last 24 hours  Temp  Min: 97.6 °F (36.4 °C)  Max: 98.9 °F (37.2 °C)   BP  Min: 99/64  Max: 131/79   Pulse  Min: 73  Max: 82   Resp  Min: 16  Max: 19   SpO2  Min: 91 %  Max: 94 %   No data recorded   Weight  Min: 92.1 kg (203 lb 0.7 oz)  Max: 92.1 kg (203 lb 0.7 oz)     Flowsheet Rows      First Filed Value   Admission Height  172.7 cm (68\") Documented at 03/17/2020 0002   Admission Weight  91.1 kg (200 lb 14.4 oz) Documented at 03/17/2020 0002          Physical Exam:     General Appearance:    Alert, cooperative, in no acute distress   Head:    Normocephalic, without obvious abnormality, atraumatic   Eyes:            Lids and lashes normal, conjunctivae and sclerae normal, no   icterus, no pallor, corneas clear, PERRLA   Ears:    Ears appear intact with no abnormalities noted   Throat:   No oral lesions, no thrush, oral mucosa moist   Neck:   No adenopathy, supple, trachea midline, no thyromegaly, no   carotid bruit, no JVD   Back:     No kyphosis present, no scoliosis present, no skin lesions,      erythema or scars, no tenderness to percussion or                   palpation,   range of motion normal   Lungs:    Decreased air entry bilaterally.    Heart:    Regular rhythm and normal rate, normal S1 and S2, no            murmur, no gallop, no rub, no click   Chest Wall:    No abnormalities observed   Abdomen:     Normal bowel sounds, no masses, no organomegaly, soft        non-tender, non-distended, no guarding, no rebound                tenderness   Rectal:     Deferred   Extremities:  Grade 4 clubbing bilaterally.  Ves all extremities well, no edema, no cyanosis, no             redness   Pulses:   Pulses " palpable and equal bilaterally   Skin:   No bleeding, bruising or rash   Lymph nodes:   No palpable adenopathy   Neurologic:   Cranial nerves 2 - 12 grossly intact, sensation intact, DTR       present and equal bilaterally         LAB DATA :     Results from last 7 days   Lab Units 03/17/20  0529   CHOLESTEROL mg/dL 134   TRIGLYCERIDES mg/dL 43   HDL CHOL mg/dL 44   LDL CHOL mg/dL 81       Laboratory results:    Results from last 7 days   Lab Units 03/19/20  0702 03/18/20  0424 03/17/20  0529   SODIUM mmol/L 134* 137 134*   POTASSIUM mmol/L 3.9 4.1 4.2   CHLORIDE mmol/L 101 104 101   CO2 mmol/L 19.5* 21.6* 20.7*   BUN mg/dL 7 5* 5*   CREATININE mg/dL 0.61* 0.61* 0.69*   CALCIUM mg/dL 8.9 8.9 9.2   GLUCOSE mg/dL 87 122* 101*     Results from last 7 days   Lab Units 03/19/20  0702 03/18/20  0424 03/17/20  0529 03/17/20  0050   WBC 10*3/mm3 13.37* 12.68* 11.58* 14.52*   HEMOGLOBIN g/dL 19.2* 18.4* 18.7* 19.1*   HEMATOCRIT % 58.6* 54.3* 56.8* 57.3*   PLATELETS 10*3/mm3 257 220 267 290     Results from last 7 days   Lab Units 03/17/20  0050   INR  1.10             Results from last 7 days   Lab Units 03/18/20  0424 03/17/20  1307   CK TOTAL U/L  --  997*   CKMB ng/mL  --  168.60*   TROPONIN T ng/mL 2.620* 1.540*                 Lab Results   Component Value Date    HGBA1C 4.80 03/17/2020               IMAGING DATA:     Ct Chest Pulmonary Embolism    Result Date: 3/18/2020  Narrative: PROCEDURE: CT CHEST PULMONARY EMBOLISM-  HISTORY: Chest pain, acute, PE suspected, high pretest prob; I21.4-Non-ST elevation (NSTEMI) myocardial infarction  COMPARISON: None .  TECHNIQUE: Multiple axial CT images were obtained from the thoracic inlet through the upper abdomen following the administration of Isovue 300 per the CT PE protocol. Coronal and oblique 3D MIP images were reconstructed from the original axial data set.  FINDINGS: There are no filling defects within the pulmonary arteries to suggest an embolus. The thoracic aorta is  normal in caliber with no evidence of dissection. The heart is normal in size. There are no pleural or pericardial effusions. There is no adenopathy. Lung windows reveal interlobular septal thickening consistent with fibrotic changes. No discrete opacities are identified. The visualized upper abdomen is unremarkable. Bone windows reveal no acute osseous abnormalities.      Impression: No evidence of pulmonary embolus or dissection.    465.62 mGy.cm   This study was performed with techniques to keep radiation doses as low as reasonably achievable (ALARA). Individualized dose reduction techniques using automated exposure control or adjustment of mA and/or kV according to the patient size were employed.  This report was finalized on 3/18/2020 10:44 AM by Rebekah Tinoco M.D..            ASSESSMENT PLAN:    #1  Non-ST elevation myocardial infarction:  Status post intervention to the LAD with good results.  There has been a loss of the diagonal branch because of which enzyme leak noted.  EKG stable symptom wise patient is stable he is being discharged in a stable condition.  Continue dual antiplatelet therapy.    2.  Coronary artery disease:  Has diffuse CAD with distal left main disease of 30 to 50% severity along with a 70% stenosis focal eccentric and a large caliber dominant RCA.  Has been offered intervention to the RCA which he wants to hold off on at this time.  We will bring him back for follow-up in the clinic in the decide on the same.    3.  Polycythemia:  Patient's hemoglobin persistently in the 19 range will keep him on low-dose Xarelto 2.5 mg p.o. twice daily till the work-up is complete.  Suggest he see hematology on outpatient basis.  Has full blown clubbing of all his fingers also.  The RV appears to be dilated.  Suggest he also do a 6-minute walk test prior to his discharge to make sure there is no longstanding hypoxemia.    4.  Nicotine abuse:  It is critical that he comes off the same this has been  explained to him all over again patient expresses understanding.    5.  COPD with emphysema:  Inhalers as per the hospitalist team.    6.  Risk profile:  Interestingly his lipids are within normal limits and his A1c is negative.  LP(a) is still pending.  We will follow this on outpatient basis.    7.  Hypertension:  Seems to be doing well on the present medications no changes made.    8.  Cor pulmonale:  RV is significantly dilated though function appears to be normal the CT chest PE protocol has been negative.  6-minute walk test prior to discharge.    Patient to follow-up with me in 3 to 4 weeks time.    Suggested cardiac medications:  Aspirin 81 daily, Brilinta 90 twice daily, Xarelto 2.5 twice daily, losartan 50 daily, Toprol-XL 25 mg daily Lipitor 80 nightly.  Nicotine patch.    NSTEMI (non-ST elevated myocardial infarction) (CMS/[facility])            Bernardo Dixon MD  03/19/20  09:34

## 2020-03-19 NOTE — DISCHARGE SUMMARY
[facility]   DISCHARGE SUMMARY      Name:  Flavio Haines   Age:  55 y.o.  Sex:  male  :  1965  MRN:  [id number]   Visit Number:  [id number]    Admission Date:  3/16/2020  Date of Discharge:  3/19/2020  Primary Care Physician:  Provider, No Known    Important issues to note:  You have been started on dual antiplatelet therapy which she will need to continue until instructed not to do so by Dr. Dixon.  You have been started on anticoagulant called Eliquis please continue to take this and monitor for bleeding closely.  You will need to see hematologist on an outpatient basis regarding elevated white blood cell count as well as hemoglobin and hematocrit for possible polycythemia which could have led to your heart attack.    Discharge Diagnoses:   1. NSTEMI, POA, status post left heart cath with 3 stents to the LAD  2. Polycythemia, primary vs. Secondary  3. Chronic heavy tobacco use  4. Cor pulmonale, unclear etiology  5. Chronic essential hypertension  6.  COPD, not in acute exacerbation    Problem List:       NSTEMI (non-ST elevated myocardial infarction) (CMS/HCC)      Presenting Problem:    NSTEMI (non-ST elevated myocardial infarction) (CMS/HCC) [I21.4]     Consults:     Consults     Date and Time Order Name Status Description    3/17/2020 0004 Inpatient Cardiology Consult          Consulting Physician(s)     Provider Relationship Specialty    Bernardo Dixon MD Consulting Physician Interventional Cardiology          Procedures Performed:    Procedure(s):  Left Heart Cath       History of presenting illness/Hospital Course:  Patient is a 55-year-old male with medical history of heavy tobacco use who presented to the ER on 3/16/2020 with complaints of left-sided chest pain and pressure.  He apparently woke up at 4 AM on the day of admission complaining of left-sided chest pain which radiated down his left arm.  He tried Tylenol without any improvement in symptoms.   "It persisted through the day so he finally presented to [email or web address] in the evening.      Patient was noted to have an elevation of troponin, without any evidence of acute ST elevation MI on EKG.  However his pain persisted so he was given 324 mg aspirin and 180 mg of Brilinta.  He was started on a heparin drip and Dr. Dixon was contacted.    Patient underwent a left heart cath on 3/16/2020 and received 3 stents to the LAD with 70% stenosis of the RCA which is being managed medically at this time and may need intervention later down the line.  On 2D echocardiogram patient was found to have RV dilation concerning for possible subacute/chronic  PE.  A CTA chest was obtained which ruled out a PE.    Further review of his labs showed that the patient's hemoglobin and hematocrit were persistently elevated above 18 with an elevated RBC count.  On exam the patient was noted to have significant clubbing of his bilateral hands.  Patient also reported a history of 1 of his younger brothers passing away from complications of leukemia and another brother having had a \" heart attack\" at home at the age of 44 but never mated to the hospital.  An EPO level and peripheral smear have been ordered.  Dr. Dixon has placed him on Eliquis 2.5 mg twice daily empirically.  Patient is being advised to see a hematologist upon discharge to further evaluate this matter as he can potentially have polycythemia vera leading to the N STEMI.  Patient's A1c and lipid profile are within normal range.    Patient appears highly motivated to stop smoking.  He has been wearing nicotine patches here and is requesting to be prescribed nicotine patches to go home with.  This will be prescribed for him on discharge.    Patient has evidence of COPD from chronic heavy smoking.  I will start him on maintenance medications in the form of Dulera which is on formulary for him as well as Ventolin as needed.    Patient is being discharged on guideline directed " medical therapy including aspirin, Brilinta, losartan and metoprolol.  He will be continued on statin until instructed by Dr. Dixon to do otherwise.    Patient did undergo 6-minute walk test today and did not require any oxygen on exertion.  He reports feeling markedly better this morning without any significant chest pain or shortness of air.  He denies any palpitations, headache or dizziness.  He is eager to be discharged home and has agreed to follow-up with Dr. Dixon, his primary care physician as well as a hematologist.    Vital Signs:    Temp:  [97.6 °F (36.4 °C)-98.9 °F (37.2 °C)] 97.6 °F (36.4 °C)  Heart Rate:  [73-82] 75  Resp:  [16-19] 19  BP: ()/(64-85) 105/85    Physical Exam:    General Appearance:  Alert and cooperative, not in any acute distress.   Head:  Atraumatic and normocephalic, without obvious abnormality.   Eyes:          PERRLA, conjunctivae and sclerae normal, no Icterus. No pallor. Extraocular movements are within normal limits.   Ears:  Ears appear intact with no abnormalities noted.   Throat: No oral lesions, no thrush, oral mucosa moist.   Neck: Supple, trachea midline, no thyromegaly, no carotid bruit.   Back:   No kyphoscoliosis present. No tenderness to palpation,   range of motion normal.   Lungs:   Chest shape is normal. Breath sounds heard bilaterally equally.  No crackles or wheezing. No Pleural rub or bronchial breathing.   Heart:  Normal S1 and S2, no murmur, no gallop, no rub. No JVD.   Abdomen:   Normal bowel sounds, no masses, no organomegaly. Soft, non-tender, non-distended, no guarding, no rebound tenderness.   Extremities: Moves all extremities well, no edema, no cyanosis, + clubbing.   Pulses: Pulses palpable and equal bilaterally.   Skin: No bleeding, bruising or rash.   Lymph nodes: No palpable adenopathy.   Neurologic: Alert and oriented x 3. Moves all four limbs equally. No tremors. No facial asymmetry.     Pertinent Lab Results:     Results from last 7 days      Lab Units 03/19/20  0702 03/18/20  0424 03/17/20  0529   SODIUM mmol/L 134* 137 134*   POTASSIUM mmol/L 3.9 4.1 4.2   CHLORIDE mmol/L 101 104 101   CO2 mmol/L 19.5* 21.6* 20.7*   BUN mg/dL 7 5* 5*   CREATININE mg/dL 0.61* 0.61* 0.69*   CALCIUM mg/dL 8.9 8.9 9.2   GLUCOSE mg/dL 87 122* 101*     Results from last 7 days   Lab Units 03/19/20  0702 03/18/20  0424 03/17/20  0529   WBC 10*3/mm3 13.37* 12.68* 11.58*   HEMOGLOBIN g/dL 19.2* 18.4* 18.7*   HEMATOCRIT % 58.6* 54.3* 56.8*   PLATELETS 10*3/mm3 257 220 267     Results from last 7 days   Lab Units 03/17/20  0050   INR  1.10     Results from last 7 days   Lab Units 03/18/20  0424 03/17/20  1307 03/17/20  0529   CK TOTAL U/L  --  997*  --    TROPONIN T ng/mL 2.620* 1.540* 1.280*                           Invalid input(s): USDES,  BLOODU, NITRITITE, BACT, EP  Pain Management Panel     There is no flowsheet data to display.              Pertinent Radiology Results:    Imaging Results (All)     Procedure Component Value Units Date/Time    CT Chest Pulmonary Embolism [[id number]] Collected:  03/18/20 1043     Updated:  03/18/20 1046    Narrative:       PROCEDURE: CT CHEST PULMONARY EMBOLISM-     HISTORY: Chest pain, acute, PE suspected, high pretest prob;  I21.4-Non-ST elevation (NSTEMI) myocardial infarction     COMPARISON: None .     TECHNIQUE: Multiple axial CT images were obtained from the thoracic  inlet through the upper abdomen following the administration of Isovue  300 per the CT PE protocol. Coronal and oblique 3D MIP images were  reconstructed from the original axial data set.      FINDINGS: There are no filling defects within the pulmonary arteries to  suggest an embolus. The thoracic aorta is normal in caliber with no  evidence of dissection. The heart is normal in size. There are no  pleural or pericardial effusions. There is no adenopathy. Lung windows  reveal interlobular septal thickening consistent with fibrotic changes.  No discrete opacities are  identified. The visualized upper abdomen is  unremarkable. Bone windows reveal no acute osseous abnormalities.       Impression:       No evidence of pulmonary embolus or dissection.            465.62 mGy.cm        This study was performed with techniques to keep radiation doses as low  as reasonably achievable (ALARA). Individualized dose reduction  techniques using automated exposure control or adjustment of mA and/or  kV according to the patient size were employed.      This report was finalized on 3/18/2020 10:44 AM by Rebekah Tinoco M.D..          Condition on Discharge:      Stable.    Code status during the hospital stay:    Code Status and Medical Interventions:   Ordered at: 03/17/20 0004     Code Status:    CPR     Medical Interventions (Level of Support Prior to Arrest):    Full       Discharge Disposition:    Home or Self Care    Discharge Medications:       Discharge Medications      New Medications      Instructions Start Date   albuterol sulfate  (90 Base) MCG/ACT inhaler  Commonly known as:  PROVENTIL HFA;VENTOLIN HFA;PROAIR HFA   2 puffs, Inhalation, Every 4 Hours PRN      aspirin 81 MG EC tablet   81 mg, Oral, Daily   Start Date:  March 20, 2020     atorvastatin 80 MG tablet  Commonly known as:  LIPITOR   80 mg, Oral, Nightly      famotidine 20 MG tablet  Commonly known as:  PEPCID   20 mg, Oral, 2 Times Daily Before Meals      fluticasone 50 MCG/ACT nasal spray  Commonly known as:  FLONASE   2 sprays, Each Nare, Daily   Start Date:  March 20, 2020     losartan 50 MG tablet  Commonly known as:  COZAAR   50 mg, Oral, Daily   Start Date:  March 20, 2020     metoprolol succinate XL 25 MG 24 hr tablet  Commonly known as:  TOPROL-XL   25 mg, Oral, Every 24 Hours Scheduled   Start Date:  March 20, 2020     mometasone-formoterol 50-5 MCG/ACT inhaler  Commonly known as:  Dulera   2 puffs, Inhalation, 2 Times Daily - RT      nicotine 21 MG/24HR patch  Commonly known as:  NICODERM CQ   1 patch,  Transdermal, Every 24 Hours   Start Date:  March 20, 2020     nitroglycerin 0.4 MG SL tablet  Commonly known as:  NITROSTAT   0.4 mg, Sublingual, Every 5 Minutes PRN, Take no more than 3 doses in 15 minutes.      Rivaroxaban 2.5 MG tablet  Commonly known as:  XARELTO   2.5 mg, Oral, 2 Times Daily With Meals      ticagrelor 90 MG tablet tablet  Commonly known as:  BRILINTA   90 mg, Oral, 2 Times Daily             Discharge Diet:     Diet Instructions     Diet: Cardiac      Discharge Diet:  Cardiac          Activity at Discharge:     Activity Instructions     As tolerated                Follow-up Appointments:    Additional Instructions for the Follow-ups that You Need to Schedule     Discharge Follow-up with PCP   As directed       Currently Documented PCP:    Provider, No Known    PCP Phone Number:    [phone]     Follow Up Details:  1 week         Discharge Follow-up with Specialty: Hematology; 2 Weeks   As directed      Specialty:  Hematology    Follow Up:  2 Weeks    Follow Up Details:  Polycythemia           Follow-up Information     Jaun Maldonado DO Follow up on 3/25/2020.    Specialty:  Family Medicine  Why:  @ 8:45  with Marycruz Anguiano  Contact information:  [address] [place] [address]  [phone]             Bernardo Dixon MD Follow up on 4/21/2020.    Specialties:  Interventional Cardiology, Cardiology  Why:  @ 3:00 pm  Contact information:  [address]  [facility] [address]  [phone]             Provider, No Known .    Why:  1 week  Contact information:  [facility] [address]  [phone]                   Future Appointments   Date Time Provider Department Center   3/25/2020  8:45 AM Marycruz Mckenna APRN [facility] None       Additional Instructions for the Follow-ups that You Need to Schedule     Discharge Follow-up with PCP   As directed       Currently Documented PCP:    Provider, No Known    PCP Phone Number:     [phone]     Follow Up Details:  1 week         Discharge Follow-up with Specialty: Hematology; 2 Weeks   As directed      Specialty:  Hematology    Follow Up:  2 Weeks    Follow Up Details:  Polycythemia               Test Results Pending at Discharge:     Order Current Status    Erythropoietin In process    Lipoprotein A (LPA) In process    Peripheral Blood Smear In process             Abelardo Woodward MD  03/19/20  11:38    Time spent: 25  minutes    Dictated utilizing Dragon dictation.

## 2020-03-19 NOTE — PROGRESS NOTES
Continued Stay Note  [facility]     Patient Name: Flavio Haines  MRN: [id number]  Today's Date: 3/19/2020    Admit Date: 3/16/2020    Discharge Plan     Row Name 03/19/20 1022       Plan    Plan No home o2 needed.  Sat did not drop below 89 with 6min walk.  Denies discharge needs.          Discharge Codes    No documentation.       Expected Discharge Date and Time     Expected Discharge Date Expected Discharge Time    Mar 19, 2020             Sharlene Jefferson RN

## 2020-03-19 NOTE — PLAN OF CARE
Problem: Patient Care Overview  Goal: Plan of Care Review  Outcome: Ongoing (interventions implemented as appropriate)  Flowsheets  Taken 3/19/2020 0355  Progress: improving  Outcome Summary: no acute events noted throughout the night. ambulation encouraged. adequate UOP. smoking cessation teaching completed. VSS, will continue to monitor  Taken 3/18/2020 2016  Plan of Care Reviewed With: patient

## 2020-03-19 NOTE — PROGRESS NOTES
Case Management Discharge Note           Provided Post Acute Provider List?: N/A  N/A Provider List Comment: icu            Transportation Services  Private: Car    Final Discharge Disposition Code: 01 - home or self-care

## 2020-03-20 ENCOUNTER — READMISSION MANAGEMENT (OUTPATIENT)
Dept: CALL CENTER | Facility: HOSPITAL | Age: 55
End: 2020-03-20

## 2020-03-20 LAB
CYTOLOGIST CVX/VAG CYTO: NORMAL
PATH INTERP BLD-IMP: NORMAL

## 2020-03-20 NOTE — OUTREACH NOTE
Prep Survey      Responses   [facility] patient discharged from?  [facility]   Is LACE score < 7 ?  No   Eligibility  Readm Mgmt   Discharge diagnosis   NSTEMI,   Does the patient have one of the following disease processes/diagnoses(primary or secondary)?  Acute MI (STEMI,NSTEMI)   Does the patient have Home health ordered?  No   Is there a DME ordered?  No   Prep survey completed?  Yes          Minnie Olson RN

## 2020-03-25 ENCOUNTER — OFFICE VISIT (OUTPATIENT)
Dept: FAMILY MEDICINE CLINIC | Facility: CLINIC | Age: 55
End: 2020-03-25

## 2020-03-25 ENCOUNTER — RESULTS ENCOUNTER (OUTPATIENT)
Dept: FAMILY MEDICINE CLINIC | Facility: CLINIC | Age: 55
End: 2020-03-25

## 2020-03-25 VITALS
TEMPERATURE: 97.6 F | OXYGEN SATURATION: 94 % | HEART RATE: 82 BPM | BODY MASS INDEX: 30.99 KG/M2 | DIASTOLIC BLOOD PRESSURE: 60 MMHG | HEIGHT: 68 IN | WEIGHT: 204.5 LBS | SYSTOLIC BLOOD PRESSURE: 110 MMHG

## 2020-03-25 DIAGNOSIS — R68.3 CLUBBING OF FINGERS: ICD-10-CM

## 2020-03-25 DIAGNOSIS — I82.90 THROMBOSIS: ICD-10-CM

## 2020-03-25 DIAGNOSIS — I25.10 CORONARY ARTERY DISEASE INVOLVING NATIVE CORONARY ARTERY OF NATIVE HEART WITHOUT ANGINA PECTORIS: ICD-10-CM

## 2020-03-25 DIAGNOSIS — Z11.59 NEED FOR HEPATITIS C SCREENING TEST: ICD-10-CM

## 2020-03-25 DIAGNOSIS — Z12.11 SCREEN FOR COLON CANCER: ICD-10-CM

## 2020-03-25 DIAGNOSIS — J41.0 SIMPLE CHRONIC BRONCHITIS (HCC): ICD-10-CM

## 2020-03-25 DIAGNOSIS — I21.4 NSTEMI (NON-ST ELEVATED MYOCARDIAL INFARCTION) (HCC): Primary | ICD-10-CM

## 2020-03-25 PROCEDURE — 99214 OFFICE O/P EST MOD 30 MIN: CPT | Performed by: NURSE PRACTITIONER

## 2020-03-25 NOTE — PROGRESS NOTES
"Transitional Care Follow Up Visit  Subjective     Flavio Haines is a 55 y.o. male who presents for a transitional care management visit.    Within 48 business hours after discharge our office contacted him via telephone to coordinate his care and needs.      I reviewed and discussed the details of that call along with the discharge summary, hospital problems, inpatient lab results, inpatient diagnostic studies, and consultation reports with Flavio.     Current outpatient and discharge medications have been reconciled for the patient.  Reviewed by: YAMILE Bruno      No flowsheet data found.  Risk for Readmission (LACE) Score: 7 (3/19/2020  6:00 AM)      History of Present Illness   Patient presents for follow-up from hospitalization detailed below.  He reports he is doing well.  Tolerating medications without side effects.  No signs of bleeding.  No dizziness or headache.  Does have some mild \"sore spots\".  No chest pain or back pain like he was experiencing prior to his hospitalization.  He still has some cough that he notes is worse in the morning.  Is tolerating Dulera.  Has not smoked.  Is currently using nicotine patches.  Asks about down titration.     Course During Hospital Stay: History of presenting illness/Hospital Course:  Patient is a 55-year-old male with medical history of heavy tobacco use who presented to the ER on 3/16/2020 with complaints of left-sided chest pain and pressure.  He apparently woke up at 4 AM on the day of admission complaining of left-sided chest pain which radiated down his left arm.  He tried Tylenol without any improvement in symptoms.  It persisted through the day so he finally presented to an outlying ER in the evening.       Patient was noted to have an elevation of troponin, without any evidence of acute ST elevation MI on EKG.  However his pain persisted so he was given 324 mg aspirin and 180 mg of Brilinta.  He was started on a heparin drip and Dr. Dixon was " "contacted.     Patient underwent a left heart cath on 3/16/2020 and received 3 stents to the LAD with 70% stenosis of the RCA which is being managed medically at this time and may need intervention later down the line.  On 2D echocardiogram patient was found to have RV dilation concerning for possible subacute/chronic  PE.  A CTA chest was obtained which ruled out a PE.     Further review of his labs showed that the patient's hemoglobin and hematocrit were persistently elevated above 18 with an elevated RBC count.  On exam the patient was noted to have significant clubbing of his bilateral hands.  Patient also reported a history of 1 of his younger brothers passing away from complications of leukemia and another brother having had a \" heart attack\" at home at the age of 44 but never mated to the hospital.  An EPO level and peripheral smear have been ordered.  Dr. Dixon has placed him on Eliquis 2.5 mg twice daily empirically.  Patient is being advised to see a hematologist upon discharge to further evaluate this matter as he can potentially have polycythemia vera leading to the N STEMI.  Patient's A1c and lipid profile are within normal range.     Patient appears highly motivated to stop smoking.  He has been wearing nicotine patches here and is requesting to be prescribed nicotine patches to go home with.  This will be prescribed for him on discharge.     Patient has evidence of COPD from chronic heavy smoking.  I will start him on maintenance medications in the form of Dulera which is on formulary for him as well as Ventolin as needed.     Patient is being discharged on guideline directed medical therapy including aspirin, Brilinta, losartan and metoprolol.  He will be continued on statin until instructed by Dr. Dixon to do otherwise.     Patient did undergo 6-minute walk test today and did not require any oxygen on exertion.  He reports feeling markedly better this morning without any significant chest pain or " shortness of air.  He denies any palpitations, headache or dizziness.  He is eager to be discharged home and has agreed to follow-up with Dr. Dixon, his primary care physician as well as a hematologist.     The following portions of the patient's history were reviewed and updated as appropriate: allergies, current medications, past family history, past medical history, past social history, past surgical history and problem list.    Review of Systems   Constitutional: Negative for chills, fatigue and fever.   HENT: Negative for congestion and sinus pressure.    Respiratory: Positive for cough. Negative for shortness of breath and wheezing.    Cardiovascular: Negative for chest pain, palpitations and leg swelling.   Gastrointestinal: Negative for abdominal pain, constipation, diarrhea, nausea and vomiting.   Genitourinary: Negative for dysuria and urgency.   Neurological: Negative for dizziness and headaches.   Psychiatric/Behavioral: Negative for dysphoric mood.       Objective   Physical Exam   Constitutional: He is oriented to person, place, and time. He appears well-developed and well-nourished.   HENT:   Head: Normocephalic and atraumatic.   Right Ear: Tympanic membrane, external ear and ear canal normal.   Left Ear: Tympanic membrane, external ear and ear canal normal.   Nose: Nose normal.   Mouth/Throat: Uvula is midline, oropharynx is clear and moist and mucous membranes are normal.   Eyes: Pupils are equal, round, and reactive to light. Conjunctivae, EOM and lids are normal.   Fundoscopic exam:       The right eye shows red reflex.        The left eye shows red reflex.   Neck: Carotid bruit is not present. No thyromegaly present.   Cardiovascular: Normal rate, regular rhythm, S1 normal, S2 normal and normal heart sounds.   Pulmonary/Chest: Effort normal and breath sounds normal.   Abdominal: Soft. Bowel sounds are normal. There is no hepatosplenomegaly. There is no tenderness.   Lymphadenopathy:        Head  (right side): No submental, no submandibular, no tonsillar, no preauricular, no posterior auricular and no occipital adenopathy present.        Head (left side): No submental, no submandibular, no tonsillar, no preauricular, no posterior auricular and no occipital adenopathy present.     He has no cervical adenopathy.   Neurological: He is alert and oriented to person, place, and time. He has normal strength. No cranial nerve deficit or sensory deficit. He displays a negative Romberg sign.   Skin: Skin is warm and dry. Nails show clubbing.   Psychiatric: He has a normal mood and affect. His speech is normal.   Nursing note and vitals reviewed.      Assessment/Plan   Flavio was seen today for establish care, follow-up and ear fullness.    Diagnoses and all orders for this visit:    NSTEMI (non-ST elevated myocardial infarction) (CMS/HCC)  -     CBC Auto Differential  -     Comprehensive Metabolic Panel    Thrombosis  -     JAK2 EXON 12-15 MUTATION ANALYSIS  -     CBC Auto Differential  -     Ambulatory Referral to Hematology / Oncology    Screen for colon cancer  -     Cologuard - Stool, Per Rectum; Future    Need for hepatitis C screening test  -     Hepatitis C Antibody    Clubbing of fingers    Simple chronic bronchitis (CMS/HCC)    Coronary artery disease involving native coronary artery of native heart without angina pectoris      --Follow-up with cardiology as scheduled.  Continue metoprolol, losartan, Brilinta, Xarelto, aspirin, statin.  Diet discussed at length.  Physical activity discussed at length.  --Continue Pepcid for GI prophylaxis  --Continue nicotine patches for now discussed stepping down to 14 mg.  Congratulated on tobacco cessation  --COPD stable.  Continue Dulera and albuterol.  --Preventive care as above  --Repeat labs and check Sameer 2 mutation.  Referral to hematology for likely polycythemia vera.  Continue aspirin

## 2020-03-26 ENCOUNTER — READMISSION MANAGEMENT (OUTPATIENT)
Dept: CALL CENTER | Facility: HOSPITAL | Age: 55
End: 2020-03-26

## 2020-03-26 NOTE — OUTREACH NOTE
AMI Week 1 Survey      Responses   [facility] patient discharged from?  [facility]   Does the patient have one of the following disease processes/diagnoses(primary or secondary)?  Acute MI (STEMI,NSTEMI)   Is there a successful TCM telephone encounter documented?  No   Week 1 attempt successful?  Yes   Call start time  1241   Call end time  1250   Discharge diagnosis   NSTEMI,   Meds reviewed with patient/caregiver?  Yes   Is the patient having any side effects they believe may be caused by any medication additions or changes?  No   Does the patient have all prescriptions related to this admission filled (includes statins,anticoagulants,HTN meds,anti-arrhythmia meds)  Yes   Is the patient taking all medications as directed (includes completed medication regime)?  Yes   Does the patient have a primary care provider?   Yes   Does the patient have an appointment with their PCP,cardiologist,or clinic within 7 days of discharge?  Yes   Has the patient kept scheduled appointments due by today?  Yes   Has home health visited the patient within 72 hours of discharge?  N/A   Psychosocial issues?  No   Did the patient receive a copy of their discharge instructions?  Yes   Nursing interventions  Reviewed instructions with patient   What is the patient's perception of their health status since discharge?  Improving   Nursing interventions  Nurse provided patient education   Is the patient/caregiver able to teach back signs and symptoms of when to call for help immediately:  Sudden chest discomfort, Sudden discomfort in arms, back, neck or jaw, Shortness of breath at any time, Sudden sweating or clammy skin, Nausea or vomiting, Dizziness or lightheadedness, Irregular or rapid heart rate   Nursing interventions  Nurse provided patient education   Is the patient/caregiver able to teach back lifestyle changes to help prevent MIs  Quit smoking, Regular exercise as approved by provider, Heart healthy diet, Reducing stress   Is  the patient/caregiver able to teach back ways to prevent a second heart attack:  Take medications, Follow up with MD, Manage risk factors   If the patient is a current smoker, are they able to teach back resources for cessation?  Smoking cessation medications   Is the patient/caregiver able to teach back the hierarchy of who to call/visit for symptoms/problems? PCP, Specialist, Home health nurse, Urgent Care, ED, 911  Yes   Additional teach back comments  Pt states he is feeling well and has not been smoking since d/c from the hospital.    Week 1 call completed?  Yes          Gabby Acosta, RN

## 2020-03-31 ENCOUNTER — READMISSION MANAGEMENT (OUTPATIENT)
Dept: CALL CENTER | Facility: HOSPITAL | Age: 55
End: 2020-03-31

## 2020-03-31 RX ORDER — FAMOTIDINE 40 MG/1
20 TABLET, FILM COATED ORAL
Qty: 30 TABLET | Refills: 0 | Status: SHIPPED | OUTPATIENT
Start: 2020-03-31 | End: 2020-04-01 | Stop reason: SDUPTHER

## 2020-03-31 NOTE — OUTREACH NOTE
"AMI Week 2 Survey      Responses   [facility] patient discharged from?  [facility]   Does the patient have one of the following disease processes/diagnoses(primary or secondary)?  Acute MI (STEMI,NSTEMI)   Week 2 attempt successful?  Yes   Call start time  1840   Call end time  1850   Discharge diagnosis   NSTEMI,   Person spoke with today (if not patient) and relationship  Spouse-pt in background   Meds reviewed with patient/caregiver?  Yes   Is the patient having any side effects they believe may be caused by any medication additions or changes?  No   Does the patient have all prescriptions related to this admission filled (includes statins,anticoagulants,HTN meds,anti-arrhythmia meds)  Yes   Is the patient taking all medications as directed (includes completed medication regime)?  Yes   Medication comments  Spouse reports they are running low on Dulera and has already messaged provider via My Chart   Does the patient have a primary care provider?   Yes   Comments regarding PCP  Pt saw NP    Has the patient kept scheduled appointments due by today?  Yes   Psychosocial issues?  No   Comments  spouse states pt is getting energy back.  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     Did the patient receive a copy of their discharge instructions?  Yes   Nursing interventions  Reviewed instructions with patient   What is the patient's perception of their health status since discharge?  Improving   Is the pateint /caregiver able to teach back the importance of cardiac rehab?  Yes   Is the patient/caregiver able to teach back the hierarchy of who to call/visit for symptoms/problems? PCP, Specialist, Home health nurse, Urgent Care, ED, 911  Yes   Week 2 call completed?  Yes          Cuca Mosqueda RN  "

## 2020-04-01 DIAGNOSIS — K21.9 GASTROESOPHAGEAL REFLUX DISEASE WITHOUT ESOPHAGITIS: ICD-10-CM

## 2020-04-01 DIAGNOSIS — J41.0 SIMPLE CHRONIC BRONCHITIS (HCC): Primary | ICD-10-CM

## 2020-04-01 DIAGNOSIS — Z72.0 TOBACCO ABUSE: ICD-10-CM

## 2020-04-01 DIAGNOSIS — I21.4 NSTEMI (NON-ST ELEVATED MYOCARDIAL INFARCTION) (HCC): ICD-10-CM

## 2020-04-01 RX ORDER — NICOTINE 21 MG/24HR
1 PATCH, TRANSDERMAL 24 HOURS TRANSDERMAL EVERY 24 HOURS
Qty: 28 PATCH | Refills: 0 | Status: SHIPPED | OUTPATIENT
Start: 2020-04-01 | End: 2020-06-22

## 2020-04-01 RX ORDER — FAMOTIDINE 20 MG/1
20 TABLET, FILM COATED ORAL
Qty: 60 TABLET | Refills: 5 | Status: SHIPPED | OUTPATIENT
Start: 2020-04-01 | End: 2020-06-11

## 2020-04-07 ENCOUNTER — READMISSION MANAGEMENT (OUTPATIENT)
Dept: CALL CENTER | Facility: HOSPITAL | Age: 55
End: 2020-04-07

## 2020-04-07 LAB
ALBUMIN SERPL-MCNC: 4 G/DL (ref 3.5–5.2)
ALBUMIN/GLOB SERPL: 1 G/DL
ALP SERPL-CCNC: 83 U/L (ref 39–117)
ALT SERPL-CCNC: 12 U/L (ref 1–41)
AST SERPL-CCNC: 12 U/L (ref 1–40)
BASOPHILS # BLD AUTO: 0.08 10*3/MM3 (ref 0–0.2)
BASOPHILS NFR BLD AUTO: 0.8 % (ref 0–1.5)
BILIRUB SERPL-MCNC: 1.1 MG/DL (ref 0.2–1.2)
BUN SERPL-MCNC: 10 MG/DL (ref 6–20)
BUN/CREAT SERPL: 10.5 (ref 7–25)
CALCIUM SERPL-MCNC: 9.7 MG/DL (ref 8.6–10.5)
CHLORIDE SERPL-SCNC: 98 MMOL/L (ref 98–107)
CITATION REF LAB TEST: NORMAL
CO2 SERPL-SCNC: 24.8 MMOL/L (ref 22–29)
CREAT SERPL-MCNC: 0.95 MG/DL (ref 0.76–1.27)
EOSINOPHIL # BLD AUTO: 0.4 10*3/MM3 (ref 0–0.4)
EOSINOPHIL NFR BLD AUTO: 3.9 % (ref 0.3–6.2)
ERYTHROCYTE [DISTWIDTH] IN BLOOD BY AUTOMATED COUNT: 13.7 % (ref 12.3–15.4)
GLOBULIN SER CALC-MCNC: 4 GM/DL
GLUCOSE SERPL-MCNC: 81 MG/DL (ref 65–99)
HCT VFR BLD AUTO: 54.4 % (ref 37.5–51)
HCV AB S/CO SERPL IA: 0.1 S/CO RATIO (ref 0–0.9)
HGB BLD-MCNC: 18.5 G/DL (ref 13–17.7)
IMM GRANULOCYTES # BLD AUTO: 0.06 10*3/MM3 (ref 0–0.05)
IMM GRANULOCYTES NFR BLD AUTO: 0.6 % (ref 0–0.5)
JAK2 GENE MUT ANL BLD/T: NORMAL
JAK2 P.V617F BLD/T QL: NORMAL
LAB DIRECTOR NAME PROVIDER: NORMAL
LAB DIRECTOR NAME PROVIDER: NORMAL
LABORATORY COMMENT REPORT: NORMAL
LABORATORY COMMENT REPORT: NORMAL
LYMPHOCYTES # BLD AUTO: 1.77 10*3/MM3 (ref 0.7–3.1)
LYMPHOCYTES NFR BLD AUTO: 17.4 % (ref 19.6–45.3)
MCH RBC QN AUTO: 31.5 PG (ref 26.6–33)
MCHC RBC AUTO-ENTMCNC: 34 G/DL (ref 31.5–35.7)
MCV RBC AUTO: 92.5 FL (ref 79–97)
MONOCYTES # BLD AUTO: 1.01 10*3/MM3 (ref 0.1–0.9)
MONOCYTES NFR BLD AUTO: 10 % (ref 5–12)
NEUTROPHILS # BLD AUTO: 6.83 10*3/MM3 (ref 1.7–7)
NEUTROPHILS NFR BLD AUTO: 67.3 % (ref 42.7–76)
NRBC BLD AUTO-RTO: 0 /100 WBC (ref 0–0.2)
PLATELET # BLD AUTO: 317 10*3/MM3 (ref 140–450)
POTASSIUM SERPL-SCNC: 4.8 MMOL/L (ref 3.5–5.2)
PROT SERPL-MCNC: 8 G/DL (ref 6–8.5)
RBC # BLD AUTO: 5.88 10*6/MM3 (ref 4.14–5.8)
REF LAB TEST METHOD: NORMAL
REFLEX: NORMAL
SODIUM SERPL-SCNC: 134 MMOL/L (ref 136–145)
SPECIMEN PREPARATION: NORMAL
WBC # BLD AUTO: 10.15 10*3/MM3 (ref 3.4–10.8)

## 2020-04-07 NOTE — OUTREACH NOTE
AMI Week 3 Survey      Responses   [facility] patient discharged from?  [facility]   Does the patient have one of the following disease processes/diagnoses(primary or secondary)?  Acute MI (STEMI,NSTEMI)   Week 3 attempt successful?  Yes   Call start time  1114   Call end time  1122   Discharge diagnosis   NSTEMI,   Is patient permission given to speak with other caregiver?  Yes   List who call center can speak with  spouse, Angeline   Meds reviewed with patient/caregiver?  Yes   Is the patient having any side effects they believe may be caused by any medication additions or changes?  Yes   Side effects comments   Patient states that he does have some shortness of breath.    Does the patient have all prescriptions related to this admission filled (includes statins,anticoagulants,HTN meds,anti-arrhythmia meds)  Yes   Is the patient taking all medications as directed (includes completed medication regime)?  Yes   Medication comments  Patient reports that b/p still runs low. Advised to call Dr Dixon's office when we hang up and report blood pressure readings and shortness of breath.    Does the patient have a primary care provider?   Yes   Has the patient kept scheduled appointments due by today?  Yes   Comments  f/u with cardiology 4/21/20.   Has home health visited the patient within 72 hours of discharge?  N/A   Psychosocial issues?  No   Did the patient receive a copy of their discharge instructions?  Yes   Nursing interventions  Reviewed instructions with patient   What is the patient's perception of their health status since discharge?  Improving   Nursing interventions  Nurse provided patient education   Is the patient/caregiver able to teach back signs and symptoms of when to call for help immediately:  Shortness of breath at any time, Sudden chest discomfort   Nursing interventions  Nurse provided patient education, Advised patient to call provider   Is the patient/caregiver able to teach back lifestyle changes  to help prevent MIs  Regular exercise as approved by provider   Is the patient/caregiver able to teach back ways to prevent a second heart attack:  Take medications, Follow up with MD   Is the patient/caregiver able to teach back the hierarchy of who to call/visit for symptoms/problems? PCP, Specialist, Home health nurse, Urgent Care, ED, 911  Yes   Week 3 call completed?  Yes          Renata Ross, RN

## 2020-04-08 ENCOUNTER — TELEPHONE (OUTPATIENT)
Dept: FAMILY MEDICINE CLINIC | Facility: CLINIC | Age: 55
End: 2020-04-08

## 2020-04-08 RX ORDER — FLUCONAZOLE 100 MG/1
100 TABLET ORAL DAILY
Qty: 7 TABLET | Refills: 0 | Status: SHIPPED | OUTPATIENT
Start: 2020-04-08 | End: 2020-04-15

## 2020-04-08 NOTE — TELEPHONE ENCOUNTER
A prescription for Diflucan was sent to his pharmacy.  Is to be taken once daily for a total of 7 days.  Please have him rinse his mouth out with water after use of his inhaler.

## 2020-04-08 NOTE — TELEPHONE ENCOUNTER
PT WAS PUT ON AN INHALER WITH STEROIDS IN IT  PT HAS DEVELOPED A YEAST IN HIS MOUTH    PT WANTS TO KNOW IF A PILL FORM MEDICATION COULD BE CALLED IN    PT ALSO WANTS TO KNOW IF HE CAN DISCONTINUE THE INHALER UNTIL YEAST IS CLEARED    PATRICIO'S CALL BACK  [phone]    GELACIO'S CALL BACK  [phone]    PHARMACY  [facility] - [place], [place] - [facility] [address] - [phone]  - [phone]   [phone]

## 2020-04-14 ENCOUNTER — READMISSION MANAGEMENT (OUTPATIENT)
Dept: CALL CENTER | Facility: HOSPITAL | Age: 55
End: 2020-04-14

## 2020-04-14 RX ORDER — LOSARTAN POTASSIUM 50 MG/1
50 TABLET ORAL DAILY
Qty: 30 TABLET | Refills: 2 | Status: SHIPPED | OUTPATIENT
Start: 2020-04-14 | End: 2020-06-22

## 2020-04-14 RX ORDER — ALBUTEROL SULFATE 90 UG/1
2 AEROSOL, METERED RESPIRATORY (INHALATION) EVERY 4 HOURS PRN
Qty: 18 G | Refills: 0 | Status: SHIPPED | OUTPATIENT
Start: 2020-04-14 | End: 2020-07-24 | Stop reason: SDUPTHER

## 2020-04-14 RX ORDER — ASPIRIN 81 MG/1
81 TABLET ORAL DAILY
Qty: 30 TABLET | Refills: 2 | Status: SHIPPED | OUTPATIENT
Start: 2020-04-14 | End: 2020-07-13

## 2020-04-14 RX ORDER — METOPROLOL SUCCINATE 25 MG/1
25 TABLET, EXTENDED RELEASE ORAL
Qty: 30 TABLET | Refills: 2 | Status: SHIPPED | OUTPATIENT
Start: 2020-04-14 | End: 2020-07-13

## 2020-04-14 RX ORDER — ATORVASTATIN CALCIUM 80 MG/1
80 TABLET, FILM COATED ORAL NIGHTLY
Qty: 30 TABLET | Refills: 2 | Status: SHIPPED | OUTPATIENT
Start: 2020-04-14 | End: 2020-07-30

## 2020-04-14 RX ORDER — FLUTICASONE PROPIONATE 50 MCG
2 SPRAY, SUSPENSION (ML) NASAL DAILY
Qty: 15.8 ML | Refills: 0 | Status: SHIPPED | OUTPATIENT
Start: 2020-04-14 | End: 2020-05-18

## 2020-04-14 NOTE — TELEPHONE ENCOUNTER
Pt called for med refills of     albuterol sulfate  (90 Base) MCG/ACT inhaler    aspirin 81 MG EC tablet    atorvastatin (LIPITOR) 80 MG tablet    fluticasone (FLONASE) 50 MCG/ACT nasal spray    losartan (COZAAR) 50 MG tablet    metoprolol succinate XL (TOPROL-XL) 25 MG 24 hr tablet    Rivaroxaban (XARELTO) 2.5 MG tablet    ticagrelor (BRILINTA) 90 MG tablet tablet            Pharmacy: [facility]    Ph: [phone]  FAX: [phone]

## 2020-04-14 NOTE — OUTREACH NOTE
AMI Week 4 Survey      Responses   [facility] patient discharged from?  [facility]   Does the patient have one of the following disease processes/diagnoses(primary or secondary)?  Acute MI (STEMI,NSTEMI)   Week 4 attempt successful?  No          Janel Green RN

## 2020-04-16 ENCOUNTER — PRIOR AUTHORIZATION (OUTPATIENT)
Dept: FAMILY MEDICINE CLINIC | Facility: CLINIC | Age: 55
End: 2020-04-16

## 2020-04-16 NOTE — TELEPHONE ENCOUNTER
A prior auth has been started through cover my meds for the xarelto.    Per the patients insurance, the medication is available without authorization.

## 2020-05-01 RX ORDER — FLUCONAZOLE 100 MG/1
100 TABLET ORAL DAILY
Qty: 7 TABLET | Refills: 0 | OUTPATIENT
Start: 2020-05-01 | End: 2020-05-08

## 2020-05-18 RX ORDER — FLUTICASONE PROPIONATE 50 UG/1
SPRAY, METERED NASAL
Qty: 15.8 ML | Refills: 5 | Status: SHIPPED | OUTPATIENT
Start: 2020-05-18 | End: 2020-06-22

## 2020-06-10 ENCOUNTER — TELEPHONE (OUTPATIENT)
Dept: FAMILY MEDICINE CLINIC | Facility: CLINIC | Age: 55
End: 2020-06-10

## 2020-06-10 NOTE — TELEPHONE ENCOUNTER
Patient's wife called and stated that the pharmacy told her that the pepcid is out of stock due to the furosemide recall.  The patient's wife would like to know if there is something else the patient can be on?    Pharmacy: [facility]  PH: [phone]  FX: [phone]    Patient's wife phone number: [phone]

## 2020-06-11 RX ORDER — PANTOPRAZOLE SODIUM 40 MG/1
40 TABLET, DELAYED RELEASE ORAL DAILY
Qty: 30 TABLET | Refills: 5 | Status: SHIPPED | OUTPATIENT
Start: 2020-06-11 | End: 2020-11-24

## 2020-06-16 ENCOUNTER — PRIOR AUTHORIZATION (OUTPATIENT)
Dept: FAMILY MEDICINE CLINIC | Facility: CLINIC | Age: 55
End: 2020-06-16

## 2020-06-16 NOTE — TELEPHONE ENCOUNTER
A prior auth has been started through cover my meds for pantoprazole.    Currently waiting on a response from the insurance.

## 2020-06-22 ENCOUNTER — OFFICE VISIT (OUTPATIENT)
Dept: FAMILY MEDICINE CLINIC | Facility: CLINIC | Age: 55
End: 2020-06-22

## 2020-06-22 ENCOUNTER — HOSPITAL ENCOUNTER (OUTPATIENT)
Dept: GENERAL RADIOLOGY | Facility: HOSPITAL | Age: 55
Discharge: HOME | End: 2020-06-22
Admitting: NURSE PRACTITIONER

## 2020-06-22 ENCOUNTER — LAB (OUTPATIENT)
Dept: LAB | Facility: HOSPITAL | Age: 55
End: 2020-06-22

## 2020-06-22 VITALS
WEIGHT: 216.38 LBS | HEIGHT: 68 IN | HEART RATE: 91 BPM | TEMPERATURE: 98 F | BODY MASS INDEX: 32.79 KG/M2 | SYSTOLIC BLOOD PRESSURE: 110 MMHG | DIASTOLIC BLOOD PRESSURE: 65 MMHG | OXYGEN SATURATION: 95 %

## 2020-06-22 DIAGNOSIS — R06.02 SHORTNESS OF BREATH: ICD-10-CM

## 2020-06-22 DIAGNOSIS — I51.7 RIGHT VENTRICULAR HYPERTROPHY: ICD-10-CM

## 2020-06-22 DIAGNOSIS — I21.4 NSTEMI (NON-ST ELEVATED MYOCARDIAL INFARCTION) (HCC): ICD-10-CM

## 2020-06-22 DIAGNOSIS — K21.9 GASTROESOPHAGEAL REFLUX DISEASE WITHOUT ESOPHAGITIS: ICD-10-CM

## 2020-06-22 DIAGNOSIS — R63.5 WEIGHT GAIN: ICD-10-CM

## 2020-06-22 DIAGNOSIS — K12.1 DENTURE STOMATITIS: ICD-10-CM

## 2020-06-22 DIAGNOSIS — I25.10 CORONARY ARTERY DISEASE INVOLVING NATIVE CORONARY ARTERY OF NATIVE HEART WITHOUT ANGINA PECTORIS: ICD-10-CM

## 2020-06-22 DIAGNOSIS — J41.0 SIMPLE CHRONIC BRONCHITIS (HCC): ICD-10-CM

## 2020-06-22 DIAGNOSIS — I82.90 THROMBOSIS: ICD-10-CM

## 2020-06-22 DIAGNOSIS — I25.10 CORONARY ARTERY DISEASE INVOLVING NATIVE CORONARY ARTERY OF NATIVE HEART WITHOUT ANGINA PECTORIS: Primary | ICD-10-CM

## 2020-06-22 LAB
ALBUMIN SERPL-MCNC: 4.2 G/DL (ref 3.5–5.2)
ALBUMIN/GLOB SERPL: 1.2 G/DL
ALP SERPL-CCNC: 88 U/L (ref 39–117)
ALT SERPL W P-5'-P-CCNC: 10 U/L (ref 1–41)
ANION GAP SERPL CALCULATED.3IONS-SCNC: 8.9 MMOL/L (ref 5–15)
AST SERPL-CCNC: 16 U/L (ref 1–40)
BASOPHILS # BLD AUTO: 0.1 10*3/MM3 (ref 0–0.2)
BASOPHILS NFR BLD AUTO: 0.8 % (ref 0–1.5)
BILIRUB SERPL-MCNC: 0.8 MG/DL (ref 0.2–1.2)
BUN BLD-MCNC: 13 MG/DL (ref 6–20)
BUN/CREAT SERPL: 10.5 (ref 7–25)
CALCIUM SPEC-SCNC: 9.6 MG/DL (ref 8.6–10.5)
CHLORIDE SERPL-SCNC: 104 MMOL/L (ref 98–107)
CO2 SERPL-SCNC: 26.1 MMOL/L (ref 22–29)
CREAT BLD-MCNC: 1.24 MG/DL (ref 0.76–1.27)
DEPRECATED RDW RBC AUTO: 53.2 FL (ref 37–54)
EOSINOPHIL # BLD AUTO: 0.85 10*3/MM3 (ref 0–0.4)
EOSINOPHIL NFR BLD AUTO: 6.5 % (ref 0.3–6.2)
ERYTHROCYTE [DISTWIDTH] IN BLOOD BY AUTOMATED COUNT: 16 % (ref 12.3–15.4)
GFR SERPL CREATININE-BSD FRML MDRD: 60 ML/MIN/1.73
GLOBULIN UR ELPH-MCNC: 3.6 GM/DL
GLUCOSE BLD-MCNC: 108 MG/DL (ref 65–99)
HCT VFR BLD AUTO: 53.6 % (ref 37.5–51)
HGB BLD-MCNC: 18.4 G/DL (ref 13–17.7)
IMM GRANULOCYTES # BLD AUTO: 0.04 10*3/MM3 (ref 0–0.05)
IMM GRANULOCYTES NFR BLD AUTO: 0.3 % (ref 0–0.5)
LYMPHOCYTES # BLD AUTO: 4.37 10*3/MM3 (ref 0.7–3.1)
LYMPHOCYTES NFR BLD AUTO: 33.5 % (ref 19.6–45.3)
MCH RBC QN AUTO: 31.7 PG (ref 26.6–33)
MCHC RBC AUTO-ENTMCNC: 34.3 G/DL (ref 31.5–35.7)
MCV RBC AUTO: 92.4 FL (ref 79–97)
MONOCYTES # BLD AUTO: 1.18 10*3/MM3 (ref 0.1–0.9)
MONOCYTES NFR BLD AUTO: 9.1 % (ref 5–12)
NEUTROPHILS # BLD AUTO: 6.49 10*3/MM3 (ref 1.7–7)
NEUTROPHILS NFR BLD AUTO: 49.8 % (ref 42.7–76)
NRBC BLD AUTO-RTO: 0 /100 WBC (ref 0–0.2)
NT-PROBNP SERPL-MCNC: 242.1 PG/ML (ref 5–900)
PLATELET # BLD AUTO: 237 10*3/MM3 (ref 140–450)
PMV BLD AUTO: 9.7 FL (ref 6–12)
POTASSIUM BLD-SCNC: 4 MMOL/L (ref 3.5–5.2)
PROT SERPL-MCNC: 7.8 G/DL (ref 6–8.5)
RBC # BLD AUTO: 5.8 10*6/MM3 (ref 4.14–5.8)
SODIUM BLD-SCNC: 139 MMOL/L (ref 136–145)
WBC NRBC COR # BLD: 13.03 10*3/MM3 (ref 3.4–10.8)

## 2020-06-22 PROCEDURE — 83880 ASSAY OF NATRIURETIC PEPTIDE: CPT

## 2020-06-22 PROCEDURE — 85025 COMPLETE CBC W/AUTO DIFF WBC: CPT

## 2020-06-22 PROCEDURE — 80053 COMPREHEN METABOLIC PANEL: CPT

## 2020-06-22 PROCEDURE — 99214 OFFICE O/P EST MOD 30 MIN: CPT | Performed by: NURSE PRACTITIONER

## 2020-06-22 PROCEDURE — 36415 COLL VENOUS BLD VENIPUNCTURE: CPT

## 2020-06-22 PROCEDURE — 71046 X-RAY EXAM CHEST 2 VIEWS: CPT

## 2020-06-22 RX ORDER — FLUCONAZOLE 100 MG/1
100 TABLET ORAL AS NEEDED
COMMUNITY
Start: 2020-05-21 | End: 2020-11-24

## 2020-06-22 RX ORDER — FAMOTIDINE 20 MG/1
20 TABLET, FILM COATED ORAL
COMMUNITY
Start: 2020-06-15 | End: 2020-11-04

## 2020-06-22 RX ORDER — LOSARTAN POTASSIUM AND HYDROCHLOROTHIAZIDE 12.5; 1 MG/1; MG/1
1 TABLET ORAL DAILY
COMMUNITY
Start: 2020-06-17 | End: 2021-02-02

## 2020-06-22 RX ORDER — PRASUGREL 10 MG/1
10 TABLET, FILM COATED ORAL DAILY
COMMUNITY
Start: 2020-06-16 | End: 2021-04-19

## 2020-06-22 RX ORDER — CHLORHEXIDINE GLUCONATE 0.12 MG/ML
RINSE ORAL
Qty: 473 ML | Refills: 0 | Status: SHIPPED | OUTPATIENT
Start: 2020-06-22 | End: 2020-07-17

## 2020-06-22 NOTE — PROGRESS NOTES
Subjective     Chief Complaint:    Chief Complaint   Patient presents with   • Follow-up     3 month follow up for NSTEMI       History of Present Illness:   Here to follow up on CAD.   Is working full time. No chest pain, dizziness, or diaphoresis.   Follows with Dr. Dixon but he is leaving town. Needs new cards referral  Notes some mild dyspnea with exertion. Tolerating breo. Is still having trouble with mouth pain. Has taken diflucan for thrush. Helps but then symptoms return.   Never f/u with Dr. Mills due to covid  Has pulm appt next month    Review of Systems   Gen- No fevers, chills  CV- No chest pain, palpitations  GI- No N/V/D, abd pain    I have reviewed and/or updated the patient's past medical, surgical, family, social history and problem list as appropriate.     Medications:    Current Outpatient Medications:   •  albuterol sulfate  (90 Base) MCG/ACT inhaler, Inhale 2 puffs by mouth Every 4 (Four) Hours As Needed for Wheezing., Disp: 18 g, Rfl: 0  •  aspirin 81 MG EC tablet, Take 1 tablet by mouth Daily, Disp: 30 tablet, Rfl: 2  •  atorvastatin (LIPITOR) 80 MG tablet, Take 1 tablet by mouth Every Night., Disp: 30 tablet, Rfl: 2  •  famotidine (PEPCID) 20 MG tablet, Take 20 mg by mouth 2 (Two) Times a Day Before Meals., Disp: , Rfl:   •  Fluticasone Furoate-Vilanterol (BREO ELLIPTA) 100-25 MCG/INH inhaler, Inhale 1 puff by mouth Daily., Disp: 60 each, Rfl: 5  •  losartan-hydrochlorothiazide (HYZAAR) 100-12.5 MG per tablet, Take 1 tablet by mouth Daily., Disp: , Rfl:   •  metoprolol succinate XL (TOPROL-XL) 25 MG 24 hr tablet, Take 1 tablet by mouth Daily., Disp: 30 tablet, Rfl: 2  •  nitroglycerin (NITROSTAT) 0.4 MG SL tablet, Place 1 tablet under the tongue Every 5 (Five) Minutes As Needed for Chest Pain. Take no more than 3 doses in 15 minutes., Disp: 25 tablet, Rfl: 0  •  prasugrel (EFFIENT) 10 MG tablet, , Disp: , Rfl:   •  Rivaroxaban (XARELTO) 2.5 MG tablet, Take 1 tablet by mouth 2  "(Two) Times a Day With Meals., Disp: 60 tablet, Rfl: 2  •  chlorhexidine (Peridex) 0.12 % solution, Soak dentures in at night, Disp: 473 mL, Rfl: 0  •  fluconazole (DIFLUCAN) 100 MG tablet, Take 100 mg by mouth Daily., Disp: , Rfl:   •  nystatin (MYCOSTATIN) 191641 UNIT/ML suspension, Swish and spit 1 teaspoonful (5 mL) by mouth 4 (Four) Times a Day., Disp: 140 mL, Rfl: 0  •  pantoprazole (Protonix) 40 MG EC tablet, Take 1 tablet by mouth Daily., Disp: 30 tablet, Rfl: 5    Allergies:  Allergies   Allergen Reactions   • Nyquil Multi-Symptom [Pseudoeph-Doxylamine-Dm-Apap] Nausea And Vomiting   • Penicillins Rash       Objective     Vital Signs:   Vitals:    06/22/20 1444   BP: 110/65   Pulse: 91   Temp: 98 °F (36.7 °C)   SpO2: 95%   Weight: 98.1 kg (216 lb 6 oz)   Height: 172.7 cm (67.99\")       Physical Exam:    Physical Exam   Constitutional: He is oriented to person, place, and time. He appears well-developed and well-nourished.   HENT:   Head: Normocephalic and atraumatic.   Eyes: Pupils are equal, round, and reactive to light.   Neck: Neck supple.   Cardiovascular: Normal rate, regular rhythm, normal heart sounds and intact distal pulses.   Pulmonary/Chest:   Breathing is heavy  Fine crackles noted in both bases (sound inflammatory)   Abdominal: Soft. Bowel sounds are normal. He exhibits distension. There is no tenderness.   Musculoskeletal: He exhibits no edema.   Neurological: He is alert and oriented to person, place, and time.   Skin: Skin is warm and dry.   Psychiatric: He has a normal mood and affect. His behavior is normal.   Nursing note and vitals reviewed.      Assessment / Plan     Assessment/Plan:   Problem List Items Addressed This Visit        Cardiovascular and Mediastinum    NSTEMI (non-ST elevated myocardial infarction) (CMS/HCC)    Relevant Medications    nitroglycerin (NITROSTAT) 0.4 MG SL tablet    metoprolol succinate XL (TOPROL-XL) 25 MG 24 hr tablet    prasugrel (EFFIENT) 10 MG tablet    " Other Relevant Orders    Ambulatory Referral to Cardiology    Thrombosis    Coronary artery disease involving native coronary artery of native heart without angina pectoris - Primary    Relevant Medications    nitroglycerin (NITROSTAT) 0.4 MG SL tablet    metoprolol succinate XL (TOPROL-XL) 25 MG 24 hr tablet    prasugrel (EFFIENT) 10 MG tablet    Other Relevant Orders    Comprehensive Metabolic Panel (Completed)    CBC Auto Differential (Completed)    proBNP (Completed)    Ambulatory Referral to Cardiology       Respiratory    Simple chronic bronchitis (CMS/HCC)    Relevant Medications    albuterol sulfate  (90 Base) MCG/ACT inhaler    Fluticasone Furoate-Vilanterol (BREO ELLIPTA) 100-25 MCG/INH inhaler      Other Visit Diagnoses     Gastroesophageal reflux disease without esophagitis        Relevant Medications    famotidine (PEPCID) 20 MG tablet    Shortness of breath        Relevant Orders    XR Chest PA & Lateral (Completed)    proBNP (Completed)    Right ventricular hypertrophy        Relevant Medications    prasugrel (EFFIENT) 10 MG tablet    Other Relevant Orders    XR Chest PA & Lateral (Completed)    proBNP (Completed)    Ambulatory Referral to Cardiology    Weight gain        Relevant Orders    XR Chest PA & Lateral (Completed)    proBNP (Completed)    Denture stomatitis        Relevant Medications    chlorhexidine (Peridex) 0.12 % solution    nystatin (MYCOSTATIN) 287364 UNIT/ML suspension        -- will place referral to Dr. Massey for cardiology. Continue bb, losartan/hctz, effient and prasugrel. Of note still has RCA lesion that is being managed medically  -- lung sounds are concerning today. CT angio reviewed. Did have some fibrotic changes noted. Due to rhales, and dyspnea will get STAT labs and CXR. Continue breo. He is still not smoking. Keep appt with pulm  -- encouraged to call and make f/u appt with Dr. Mills due to thrombosis and clubbing. Of note, his ZAID 2 mutation was negative and he is  already anticoagulated  -- continue GI ppx  -- I suspect denture stomatitis, try nystatin and nightly soaks of dentures in peridex. Discussed proper breo usage     Follow up:  Return in about 3 months (around 9/22/2020).    Electronically signed by YAMILE Bruno   06/22/2020 3:00 PM    ADDENDUM:   LABS WNL. CXR shows interstitial lung disease and chronic changes. I am concerned for pulm fibrosis which would explain his right sided heart symptoms as well. Keep appt with pulm. 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 He declines earlier appt with pulm. BNP normal, no increase in diuretics. Discussed with wife and patient.       Please note that portions of this note may have been completed with a voice recognition program. Efforts were made to edit the dictations, but occasionally words are mistranscribed.

## 2020-07-13 RX ORDER — METOPROLOL SUCCINATE 25 MG/1
25 TABLET, EXTENDED RELEASE ORAL
Qty: 30 TABLET | Refills: 1 | Status: SHIPPED | OUTPATIENT
Start: 2020-07-13 | End: 2020-10-03 | Stop reason: SDUPTHER

## 2020-07-17 DIAGNOSIS — K12.1 DENTURE STOMATITIS: ICD-10-CM

## 2020-07-17 RX ORDER — CHLORHEXIDINE GLUCONATE 0.12 MG/ML
RINSE ORAL
Qty: 473 ML | Refills: 0 | Status: SHIPPED | OUTPATIENT
Start: 2020-07-17 | End: 2021-03-15

## 2020-07-21 ENCOUNTER — CONSULT (OUTPATIENT)
Dept: CARDIOLOGY | Facility: CLINIC | Age: 55
End: 2020-07-21

## 2020-07-21 VITALS
HEART RATE: 71 BPM | DIASTOLIC BLOOD PRESSURE: 90 MMHG | OXYGEN SATURATION: 94 % | SYSTOLIC BLOOD PRESSURE: 124 MMHG | BODY MASS INDEX: 32.83 KG/M2 | WEIGHT: 216.6 LBS | RESPIRATION RATE: 20 BRPM | HEIGHT: 68 IN

## 2020-07-21 DIAGNOSIS — I25.10 CORONARY ARTERY DISEASE INVOLVING NATIVE HEART, ANGINA PRESENCE UNSPECIFIED, UNSPECIFIED VESSEL OR LESION TYPE: Primary | ICD-10-CM

## 2020-07-21 DIAGNOSIS — E78.5 HYPERLIPIDEMIA LDL GOAL <70: ICD-10-CM

## 2020-07-21 PROBLEM — I25.119 CORONARY ARTERY DISEASE INVOLVING NATIVE CORONARY ARTERY OF NATIVE HEART WITH ANGINA PECTORIS (HCC): Status: ACTIVE | Noted: 2020-03-17

## 2020-07-21 PROBLEM — J43.1 PANLOBULAR EMPHYSEMA (HCC): Status: ACTIVE | Noted: 2020-07-21

## 2020-07-21 PROCEDURE — 99244 OFF/OP CNSLTJ NEW/EST MOD 40: CPT | Performed by: INTERNAL MEDICINE

## 2020-07-21 RX ORDER — ASPIRIN 81 MG/1
81 TABLET, CHEWABLE ORAL ONCE
Status: SHIPPED | OUTPATIENT
Start: 2020-07-21

## 2020-07-21 NOTE — PROGRESS NOTES
[facility] Cardiology OP Consult Note    Flavio Haines  [id number]  07/21/2020    Referred By: Marycruz Mckenna APRN    Chief Complaint: Reestablish cardiac care    History of Present Illness:   Mr. Flavio Haines is a 55 y.o. male who presents to the Cardiology Clinic to reestablish cardiac care.  The patient has a past medical history significant for prior tobacco use with a 1.5 pack/day use with cessation in 3/20, GERD, and hyperlipidemia.  He has a past cardiac history significant for an NSTEMI in 3/20.  Coronary angiography at that time revealed at least moderate stenosis of the distal left main, severe stenosis in the mid and distal LAD, as well as severe stenosis in the proximal RCA.  He underwent FFR assessment of the distal LAD, which was found to be unremarkable at 0.86, and he went on to have PCI to the mid and distal LAD with 2 overlapping 3.5 x 18 mm Xience HARIS in the mid LAD and a 2.75 x 23 mm Xience HARIS being placed in the distal LAD.  The patient did have loss of a moderate caliber diagonal branch during PCI.  Per patient report, there were initially plans for staged intervention to the RCA, however the decision was subsequently made to defer RCA intervention.  Echocardiogram performed at that time showed low normal LV systolic function with an LVEF of approximately 50%.  He was noted to have severe dilation of the right ventricle with mildly reduced RV systolic function.  He presents the cardiology clinic today to reestablish cardiac care after his prior cardiologist has moved locations.  Today, the patient denies any episodes of chest pain or chest discomfort.  He does, however, note fatigue as well has exertional dyspnea.  The patient reports he works in construction, and he is currently limited by his fatigue and dyspnea.  He does have a history of tobacco use with cessation in 3/20, however notes that his fatigue and dyspnea have both been progressive since  "approximately 3/20 despite smoking cessation.  He reports that he \"has not yet recovered\" after undergoing PCI.  He did not complete cardiac rehab due to the COVID-19 pandemic.  He continues to tolerate dual antiplatelet therapy with aspirin and Effient, in addition to low-dose Xarelto which was apparently initiated at the time of his NSTEMI.  No orthopnea, PND, or lower extremity swelling.  No palpitations.  No other specific complaints at this time.    Past Cardiac Testin. Last Coronary Angio: 3/17/2020   1.  Moderate stenosis of the distal left main coronary artery of 30-50%, FFR 0.86   2.  Severe stenosis of the proximal/mid LAD as well as the distal LAD    --Successful PCI to the mid LAD with placement of 2 overlapping 3.5 x 18 mm Xience HARIS    --Successful PCI of the distal LAD with placement of a 2.75 x 23 mm Xience HARIS   3.  Severe stenosis of the proximal RCA  2. Prior Stress Testing: None  3. Last Echo: 3/17/2020   1.  Borderline LV systolic function (EF is about 50%)    2.  Normal left atrial size    3.  Trace MR    4.  Mild TR and PI with normal pulmonary pressures    5.  Severe dilation of the RV with hypokinesis of the free lateral wall of the RV    6.  Reduced RV systolic function    7.  Global hypokinesis of the LV   4. Prior Holter Monitor: None    Review of Systems:   Review of Systems   Constitutional: Positive for fatigue. Negative for activity change, appetite change, chills, diaphoresis, fever, unexpected weight gain and unexpected weight loss.   Eyes: Negative for blurred vision and double vision.   Respiratory: Positive for shortness of breath. Negative for cough, chest tightness and wheezing.    Cardiovascular: Negative for chest pain, palpitations and leg swelling.   Gastrointestinal: Negative for abdominal pain, anal bleeding, blood in stool and GERD.   Endocrine: Negative for cold intolerance and heat intolerance.   Genitourinary: Negative for hematuria.   Neurological: Negative for " dizziness, syncope, weakness and light-headedness.   Hematological: Does not bruise/bleed easily.   Psychiatric/Behavioral: Negative for depressed mood and stress. The patient is not nervous/anxious.        Past Medical History:   Past Medical History:   Diagnosis Date   • Elevated red blood cell count    • GERD (gastroesophageal reflux disease)    • Heart attack (CMS/HCC)    • Hyperlipidemia        Past Surgical History:   Past Surgical History:   Procedure Laterality Date   • CARDIAC CATHETERIZATION N/A 3/17/2020    Procedure: Left Heart Cath;  Surgeon: Bernardo Dixon MD;  Location: [facility] CATH INVASIVE LOCATION;  Service: Cardiology;  Laterality: N/A;       Family History:   Family History   Problem Relation Age of Onset   • Heart disease Mother    • Heart attack Mother    • Cancer Father    • Cancer Brother    • Heart disease Brother    • Heart attack Brother    • Heart attack Brother        Social History:   Social History     Socioeconomic History   • Marital status:      Spouse name: Not on file   • Number of children: Not on file   • Years of education: Not on file   • Highest education level: Not on file   Tobacco Use   • Smoking status: Former Smoker     Packs/day: 1.50     Years: 40.00     Pack years: 60.00     Last attempt to quit: 3/16/2020     Years since quittin.3   • Smokeless tobacco: Current User     Types: Chew   Substance and Sexual Activity   • Alcohol use: Not Currently   • Drug use: Never   • Sexual activity: Defer       Medications:     Current Outpatient Medications:   •  albuterol sulfate  (90 Base) MCG/ACT inhaler, Inhale 2 puffs by mouth Every 4 (Four) Hours As Needed for Wheezing., Disp: 18 g, Rfl: 0  •  atorvastatin (LIPITOR) 80 MG tablet, Take 1 tablet by mouth Every Night., Disp: 30 tablet, Rfl: 2  •  chlorhexidine (PERIDEX) 0.12 % solution, SOAK DENTURES IN AT NIGHT, Disp: 473 mL, Rfl: 0  •  famotidine (PEPCID) 20 MG tablet, Take 20 mg by mouth 2 (Two)  "Times a Day Before Meals., Disp: , Rfl:   •  Fluticasone Furoate-Vilanterol (BREO ELLIPTA) 100-25 MCG/INH inhaler, Inhale 1 puff by mouth Daily., Disp: 60 each, Rfl: 5  •  losartan-hydrochlorothiazide (HYZAAR) 100-12.5 MG per tablet, Take 1 tablet by mouth Daily., Disp: , Rfl:   •  metoprolol succinate XL (TOPROL-XL) 25 MG 24 hr tablet, TAKE 1 TABLET BY MOUTH DAILY., Disp: 30 tablet, Rfl: 1  •  nystatin (MYCOSTATIN) 430153 UNIT/ML suspension, SWISH AND SPIT 1 TEASPOONFUL BY MOUTH FOUR TIMES DAILY, Disp: 140 mL, Rfl: 0  •  prasugrel (EFFIENT) 10 MG tablet, Take 10 mg by mouth Daily., Disp: , Rfl:   •  fluconazole (DIFLUCAN) 100 MG tablet, Take 100 mg by mouth Daily., Disp: , Rfl:   •  nitroglycerin (NITROSTAT) 0.4 MG SL tablet, Place 1 tablet under the tongue Every 5 (Five) Minutes As Needed for Chest Pain. Take no more than 3 doses in 15 minutes., Disp: 25 tablet, Rfl: 0  •  pantoprazole (Protonix) 40 MG EC tablet, Take 1 tablet by mouth Daily., Disp: 30 tablet, Rfl: 5    Current Facility-Administered Medications:   •  aspirin chewable tablet 81 mg, 81 mg, Oral, Once, Gonzalo Massey MD    Allergies:   Allergies   Allergen Reactions   • Nyquil Multi-Symptom [Pseudoeph-Doxylamine-Dm-Apap] Nausea And Vomiting   • Penicillins Rash       Physical Exam:  Vital Signs:   Vitals:    07/21/20 0831 07/21/20 0840   BP: 122/78 124/90   BP Location: Right arm Left arm   Patient Position: Sitting Sitting   Cuff Size: Adult Adult   Pulse: 71    Resp: 20    SpO2: 94%    Weight: 98.2 kg (216 lb 9.6 oz)    Height: 172.7 cm (68\")        Physical Exam   Constitutional: He is oriented to person, place, and time. He appears well-developed and well-nourished. No distress.   HENT:   Head: Normocephalic and atraumatic.   Moist Mucous Membranes.    Eyes: Pupils are equal, round, and reactive to light. EOM are normal. No scleral icterus.   Neck: No tracheal deviation present.   Cardiovascular: Normal rate, regular rhythm, normal heart " sounds and intact distal pulses. Exam reveals no gallop and no friction rub.   No murmur heard.  Pulmonary/Chest: Effort normal and breath sounds normal. No stridor. No respiratory distress. He has no wheezes. He has no rales. He exhibits no tenderness.   Abdominal: Soft. Bowel sounds are normal. He exhibits no distension. There is no tenderness. There is no rebound and no guarding.   Musculoskeletal: Normal range of motion. He exhibits no edema.   Lymphadenopathy:     He has no cervical adenopathy.   Neurological: He is alert and oriented to person, place, and time.   Skin: Skin is warm and dry. He is not diaphoretic. No erythema.   Right distal radial access site appears to be well-healed.   Psychiatric: He has a normal mood and affect. His behavior is normal.       Results Review:   I reviewed the patient's new clinical results.        Assessment / Plan:     1.  Coronary artery disease  --History of multivessel coronary artery disease diagnosed at the time of an NSTEMI in 3/20  --Underwent PCI with HARIS x2 to the mid LAD and PCI x1 to the distal LAD  --On review of films, there appears to be severe stenosis of the distal left main up to 70% and involving the ostial LCx.  Suspect FFR at the time of coronary angiography likely inaccurate given severe LAD disease.  No intravascular imaging of the distal left main performed.  Additionally has severe residual stenosis involving the proximal RCA  --Echocardiogram shows low normal LV systolic function, he does have severe right ventricular dilation with mildly reduced RV systolic function possibly secondary to underlying pulmonary disease  --Continued fatigue and exertional dyspnea despite smoking cessation and PCI to the LAD concerning for persistent angina in the setting of severe left main and RCA disease  --Discussed options of management with the patient, and will refer to CT surgery for consideration of CABG  --If further assessment of the distal left main, ostial  LCx, or RCA required, would then consider DANIA of the left main/LCx as well as RFR assessment of the RCA  --Will continue dual antiplatelet therapy with aspirin and Effient  --Discontinue low-dose Xarelto  --Continue high intensity statin, beta-blocker, and ARB  --Follow-up in 3 months, or sooner if required    2.  Hyperlipidemia LDL goal <70  --Continue high intensity statin    3.  History of tobacco use  --Reports complete cessation in 3/20      Follow Up:   Return in about 3 months (around 10/21/2020).      Thank you for allowing me to participate in the care of your patient. Please to not hesitate to contact me with additional questions or concerns.     MANUEL Massey MD  Interventional Cardiology   07/21/2020  8:34 AM

## 2020-07-24 ENCOUNTER — OFFICE VISIT (OUTPATIENT)
Dept: PULMONOLOGY | Facility: CLINIC | Age: 55
End: 2020-07-24

## 2020-07-24 VITALS
TEMPERATURE: 97.2 F | OXYGEN SATURATION: 95 % | SYSTOLIC BLOOD PRESSURE: 132 MMHG | DIASTOLIC BLOOD PRESSURE: 78 MMHG | HEIGHT: 68 IN | RESPIRATION RATE: 20 BRPM | BODY MASS INDEX: 32.89 KG/M2 | WEIGHT: 217 LBS | HEART RATE: 72 BPM

## 2020-07-24 DIAGNOSIS — D75.1 POLYCYTHEMIA: ICD-10-CM

## 2020-07-24 DIAGNOSIS — I25.119 CORONARY ARTERY DISEASE INVOLVING NATIVE CORONARY ARTERY OF NATIVE HEART WITH ANGINA PECTORIS (HCC): ICD-10-CM

## 2020-07-24 DIAGNOSIS — R68.3 CLUBBING OF FINGERS: ICD-10-CM

## 2020-07-24 DIAGNOSIS — J41.0 SIMPLE CHRONIC BRONCHITIS (HCC): ICD-10-CM

## 2020-07-24 DIAGNOSIS — Z87.891 HISTORY OF NICOTINE DEPENDENCE: ICD-10-CM

## 2020-07-24 DIAGNOSIS — I51.9 DYSFUNCTION OF RIGHT CARDIAC VENTRICLE: ICD-10-CM

## 2020-07-24 DIAGNOSIS — R06.09 DYSPNEA ON EXERTION: Primary | ICD-10-CM

## 2020-07-24 DIAGNOSIS — J84.9 ILD (INTERSTITIAL LUNG DISEASE) (HCC): ICD-10-CM

## 2020-07-24 PROCEDURE — 94618 PULMONARY STRESS TESTING: CPT | Performed by: INTERNAL MEDICINE

## 2020-07-24 PROCEDURE — 99204 OFFICE O/P NEW MOD 45 MIN: CPT | Performed by: INTERNAL MEDICINE

## 2020-07-24 RX ORDER — BUDESONIDE AND FORMOTEROL FUMARATE DIHYDRATE 80; 4.5 UG/1; UG/1
2 AEROSOL RESPIRATORY (INHALATION) 2 TIMES DAILY
Qty: 3 INHALER | Refills: 3 | Status: SHIPPED | OUTPATIENT
Start: 2020-07-24 | End: 2021-03-15

## 2020-07-24 RX ORDER — ALBUTEROL SULFATE 90 UG/1
2 AEROSOL, METERED RESPIRATORY (INHALATION) EVERY 4 HOURS PRN
Qty: 18 G | Refills: 0 | Status: SHIPPED | OUTPATIENT
Start: 2020-07-24 | End: 2020-11-06

## 2020-07-24 NOTE — PROGRESS NOTES
New Pulmonary Patient Office Visit      Patient Name: Flavio Haines    Referring Physician: Bernardo Dixon*    Chief Complaint:    Chief Complaint   Patient presents with   • Consult   • Shortness of Breath       History of Present Illness: Flavio Haines is a 55 y.o. male who is here today to establish care with Pulmonary.  Patient had not been seen by a physician in many years until he was admitted in March 2020 secondary to non-ST elevation MI.  Patient underwent heart cath with stenting, but plan for possible further intervention, but this was delayed by COVID.  He has reestablish care with another cardiologist, Dr. Massey, earlier this week and has a scheduled appointment with CT surgery for evaluation for possible CABG.    In March, he was also started on Breo daily, but has not noticed significant changes in his breathing with this.  He has had difficulty with thrush and is required nystatin swish and swallow to treat this.  Patient states he is able to walk on flat ground for about 1 city block as long as he can go at his own pace which he admits is rather slow, but has much more difficulty if he has to walk uphill.  He denies any significant cough or wheezing.  He states he will intermittently have mild cough, but does not related to any exacerbating factors.  Resolves on its own.  Does not feel like inhalers have helped with cough at all.    He has severe finger clubbing, but states his fingers have been this way for many years.  He has had multiple evaluations for supplemental oxygen, but has not qualified to this point.  Denies ever having PFTs.  He has CT scan from March 2020 which did show some fibrotic changes.  Repeat chest x-ray also showed some interstitial changes.    Patient is a former smoker and quit in March 2020, but smoked a pack and a half a day since his teenage years.  He is also a  and has had multiple exposures on construction sites.  He has never had a lung  biopsy.  Subjective      Review of Systems:   Review of Systems   Constitutional: Positive for fatigue. Negative for appetite change, fever and unexpected weight loss.   HENT: Positive for ear pain, postnasal drip and rhinorrhea. Negative for nosebleeds, sore throat and voice change.    Eyes: Negative for discharge and visual disturbance.   Respiratory: Positive for cough, shortness of breath and wheezing.    Cardiovascular: Negative for chest pain, palpitations and leg swelling.   Gastrointestinal: Negative for abdominal pain, blood in stool, constipation, diarrhea and GERD.   Endocrine: Negative for cold intolerance and heat intolerance.   Genitourinary: Negative for difficulty urinating and hematuria.   Musculoskeletal: Negative for arthralgias and myalgias.   Skin: Negative for rash and bruise.   Allergic/Immunologic: Negative for immunocompromised state.   Neurological: Negative for dizziness and weakness.   Hematological: Negative for adenopathy. Does not bruise/bleed easily.   Psychiatric/Behavioral: Negative for suicidal ideas and depressed mood.       Past Medical History:   Past Medical History:   Diagnosis Date   • Elevated red blood cell count    • GERD (gastroesophageal reflux disease)    • Heart attack (CMS/HCC)    • Hyperlipidemia        Past Surgical History:   Past Surgical History:   Procedure Laterality Date   • CARDIAC CATHETERIZATION N/A 3/17/2020    Procedure: Left Heart Cath;  Surgeon: Bernardo Dixon MD;  Location: [facility] CATH INVASIVE LOC[facility];  Service: Cardiology;  Laterality: N/A;       Family History:   Family History   Problem Relation Age of Onset   • Heart disease Mother    • Heart attack Mother    • Cancer Father    • Cancer Brother    • Heart disease Brother    • Heart attack Brother    • Heart attack Brother        Social History:   Social History     Socioeconomic History   • Marital status:      Spouse name: Not on file   • Number of children: Not on file   •  Years of education: Not on file   • Highest education level: Not on file   Tobacco Use   • Smoking status: Former Smoker     Packs/day: 1.50     Years: 40.00     Pack years: 60.00     Last attempt to quit: 3/16/2020     Years since quittin.3   • Smokeless tobacco: Current User     Types: Chew   Substance and Sexual Activity   • Alcohol use: Not Currently   • Drug use: Never   • Sexual activity: Defer       Medications:     Current Outpatient Medications:   •  albuterol sulfate  (90 Base) MCG/ACT inhaler, Inhale 2 puffs by mouth Every 4 (Four) Hours As Needed for Wheezing., Disp: 18 g, Rfl: 0  •  atorvastatin (LIPITOR) 80 MG tablet, Take 1 tablet by mouth Every Night., Disp: 30 tablet, Rfl: 2  •  chlorhexidine (PERIDEX) 0.12 % solution, SOAK DENTURES IN AT NIGHT, Disp: 473 mL, Rfl: 0  •  famotidine (PEPCID) 20 MG tablet, Take 20 mg by mouth 2 (Two) Times a Day Before Meals., Disp: , Rfl:   •  fluconazole (DIFLUCAN) 100 MG tablet, Take 100 mg by mouth As Needed., Disp: , Rfl:   •  losartan-hydrochlorothiazide (HYZAAR) 100-12.5 MG per tablet, Take 1 tablet by mouth Daily., Disp: , Rfl:   •  metoprolol succinate XL (TOPROL-XL) 25 MG 24 hr tablet, TAKE 1 TABLET BY MOUTH DAILY., Disp: 30 tablet, Rfl: 1  •  nitroglycerin (NITROSTAT) 0.4 MG SL tablet, Place 1 tablet under the tongue Every 5 (Five) Minutes As Needed for Chest Pain. Take no more than 3 doses in 15 minutes., Disp: 25 tablet, Rfl: 0  •  nystatin (MYCOSTATIN) [id number] UNIT/ML suspension, SWISH AND SPIT 1 TEASPOONFUL BY MOUTH FOUR TIMES DAILY, Disp: 140 mL, Rfl: 0  •  prasugrel (EFFIENT) 10 MG tablet, Take 10 mg by mouth Daily., Disp: , Rfl:   •  budesonide-formoterol (SYMBICORT) 80-4.5 MCG/ACT inhaler, Inhale 2 puffs 2 (Two) Times a Day., Disp: 3 inhaler, Rfl: 3  •  pantoprazole (Protonix) 40 MG EC tablet, Take 1 tablet by mouth Daily., Disp: 30 tablet, Rfl: 5    Current Facility-Administered Medications:   •  aspirin chewable tablet 81 mg, 81 mg, Oral,  "Once, Gonzalo Massey MD    Allergies:   Allergies   Allergen Reactions   • Nyquil Multi-Symptom [Pseudoeph-Doxylamine-Dm-Apap] Nausea And Vomiting   • Penicillins Rash       Objective     Physical Exam:  Vital Signs:   Vitals:    07/24/20 0900   BP: 132/78   Pulse: 72   Resp: 20   Temp: 97.2 °F (36.2 °C)   SpO2: 95%   Weight: 98.4 kg (217 lb)   Height: 172.7 cm (67.99\")       Physical Exam   Constitutional: He is oriented to person, place, and time. He appears well-developed and well-nourished. No distress.   Appears older than stated age   HENT:   Head: Normocephalic and atraumatic.   Mouth/Throat: No oropharyngeal exudate.   Eyes: EOM are normal. Right eye exhibits no discharge. Left eye exhibits no discharge. No scleral icterus.   Neck: Normal range of motion. Neck supple. No tracheal deviation present.   Cardiovascular: Normal rate and regular rhythm.   Pulmonary/Chest: No accessory muscle usage. No respiratory distress. He has no wheezes. He has rales.   Dry Velcro crackles in the bases   Abdominal: Soft. He exhibits no distension.   Musculoskeletal: Normal range of motion. He exhibits no edema.   Severe finger clubbing bilaterally, no cyanosis   Neurological: He is alert and oriented to person, place, and time.   Skin: Skin is warm and dry. No rash noted.   Psychiatric: He has a normal mood and affect. His behavior is normal.         Results Review:   Labs: Reviewed.  Results for KYLEIGH FLORES (MRN [id number]) as of 7/24/2020 09:13   Ref. Range 6/22/2020 16:14   Glucose Latest Ref Range: 65 - 99 mg/dL 108 (H)   Sodium Latest Ref Range: 136 - 145 mmol/L 139   Potassium Latest Ref Range: 3.5 - 5.2 mmol/L 4.0   CO2 Latest Ref Range: 22.0 - 29.0 mmol/L 26.1   Chloride Latest Ref Range: 98 - 107 mmol/L 104   Anion Gap Latest Ref Range: 5.0 - 15.0 mmol/L 8.9   Creatinine Latest Ref Range: 0.76 - 1.27 mg/dL 1.24        Results for KYLEIGH FLORES (MRN [id number]) as of 7/24/2020 09:13   Ref. Range " 6/22/2020 16:14   WBC Latest Ref Range: 3.40 - 10.80 10*3/mm3 13.03 (H)   RBC Latest Ref Range: 4.14 - 5.80 10*6/mm3 5.80   Hemoglobin Latest Ref Range: 13.0 - 17.7 g/dL 18.4 (H)   Hematocrit Latest Ref Range: 37.5 - 51.0 % 53.6 (H)   RDW Latest Ref Range: 12.3 - 15.4 % 16.0 (H)   MCV Latest Ref Range: 79.0 - 97.0 fL 92.4   MCH Latest Ref Range: 26.6 - 33.0 pg 31.7   MCHC Latest Ref Range: 31.5 - 35.7 g/dL 34.3   MPV Latest Ref Range: 6.0 - 12.0 fL 9.7   Platelets Latest Ref Range: 140 - 450 10*3/mm3 237       ABG: No results found for: PHART, MDC1NVG, PO2ART, HGBBG, C6QYKTBD, CFIO2, FCOHB, CARBOXYHGB, FMETHB    Echo:   Results for orders placed during the hospital encounter of 03/16/20   Adult Transthoracic Echo Complete W/ Cont if Necessary Per Protocol    Narrative Technically difficult study   1) Borderline LV systolic function (EF is about 50%)   2) Normal left atrial size   3) Trace MR   4) Mild TR and PI with normal pulmonary pressures   5) Severe dilation of the RV with hypokinesis of the free lateral wall of   the RV   6) Reduced RV systolic function   7) Global hypokinesis of the LV               Radiology Scans:   Last CT scan was reviewed in great detail with the patient. Images reviewed personally.     Results for orders placed during the hospital encounter of 03/16/20   CT Chest Pulmonary Embolism    Narrative PROCEDURE: CT CHEST PULMONARY EMBOLISM-     HISTORY: Chest pain, acute, PE suspected, high pretest prob;  I21.4-Non-ST elevation (NSTEMI) myocardial infarction     COMPARISON: None .     TECHNIQUE: Multiple axial CT images were obtained from the thoracic  inlet through the upper abdomen following the administration of Isovue  300 per the CT PE protocol. Coronal and oblique 3D MIP images were  reconstructed from the original axial data set.      FINDINGS: There are no filling defects within the pulmonary arteries to  suggest an embolus. The thoracic aorta is normal in caliber with no  evidence of  dissection. The heart is normal in size. There are no  pleural or pericardial effusions. There is no adenopathy. Lung windows  reveal interlobular septal thickening consistent with fibrotic changes.  No discrete opacities are identified. The visualized upper abdomen is  unremarkable. Bone windows reveal no acute osseous abnormalities.       Impression No evidence of pulmonary embolus or dissection.            465.62 mGy.cm        This study was performed with techniques to keep radiation doses as low  as reasonably achievable (ALARA). Individualized dose reduction  techniques using automated exposure control or adjustment of mA and/or  kV according to the patient size were employed.      This report was finalized on 3/18/2020 10:44 AM by Rebekah Tinoco M.D..         PFT IMPRESSION:   None available for review  Assessment / Plan      Assessment/Plan:    1. Dyspnea on exertion  Most likely multifactorial, but highly suspect he has underlying interstitial lung disease/fibrotic changes.  Unclear if this is all related to smoking versus a specific interstitial lung disease.  However, work-up of this would normally include lung biopsy, but given his cardiac issues I would defer this.  Check full PFTs at next clinic visit.  If significant restriction appreciated, would need high-resolution CT scan at that time.  - Full Pulmonary Function Test With Bronchodilator; Future  - 6 Minute Walk Test; Future    2. Simple chronic bronchitis (CMS/HCC)  Diagnosed with this previously, but does not feel like Breo has helped much.  Check full PFTs at next visit.  Switch from Breo to Symbicort due to difficulties with powder inhalers    3. Dysfunction of right cardiac ventricle  This is concerning for significant underlying lung disease.  Work-up as above    4. ILD (interstitial lung disease) (CMS/HCC)  Suspected related to previous imaging studies.    5. Clubbing of fingers  Check 6-minute walk during clinic  - Full Pulmonary  Function Test With Bronchodilator; Future  - 6 Minute Walk Test; Future    6. History of nicotine dependence  Reformed smoker    7. Coronary artery disease involving native coronary artery of native heart with angina pectoris (CMS/[facility])  Reviewed Dr. Massey's notes.  Plan for CT surgery evaluation which is scheduled for next week.    8. Polycythemia  Suspect this could be related to chronic hypoxia.  Started supplemental oxygen as indicated by 6-minute walk results.  May need to consider therapeutic phlebotomy if repeat hemoglobin after 3 months of O2 therapy is >16       Follow Up:   Return in about 4 weeks (around 8/21/2020).    Hattie Caban MD  Pulmonary/Critical Care Physician   [facility]      Please note that portions of this note may have been completed with a voice recognition program. Efforts were made to edit the dictations, but occasionally words are mistranscribed.

## 2020-07-27 DIAGNOSIS — J84.9 ILD (INTERSTITIAL LUNG DISEASE) (HCC): Primary | ICD-10-CM

## 2020-07-30 RX ORDER — ATORVASTATIN CALCIUM 80 MG/1
80 TABLET, FILM COATED ORAL NIGHTLY
Qty: 30 TABLET | Refills: 1 | Status: SHIPPED | OUTPATIENT
Start: 2020-07-30 | End: 2020-10-03 | Stop reason: SDUPTHER

## 2020-07-30 RX ORDER — FAMOTIDINE 40 MG/1
20 TABLET, FILM COATED ORAL
Qty: 30 TABLET | Refills: 0 | Status: SHIPPED | OUTPATIENT
Start: 2020-07-30 | End: 2020-11-06

## 2020-08-04 ENCOUNTER — OFFICE VISIT (OUTPATIENT)
Dept: CARDIAC SURGERY | Facility: CLINIC | Age: 55
End: 2020-08-04

## 2020-08-04 VITALS
DIASTOLIC BLOOD PRESSURE: 80 MMHG | HEIGHT: 68 IN | TEMPERATURE: 98.2 F | WEIGHT: 217 LBS | BODY MASS INDEX: 32.89 KG/M2 | OXYGEN SATURATION: 95 % | SYSTOLIC BLOOD PRESSURE: 122 MMHG | HEART RATE: 73 BPM

## 2020-08-04 DIAGNOSIS — Z01.810 PREOPERATIVE CARDIOVASCULAR EXAMINATION: Primary | ICD-10-CM

## 2020-08-04 DIAGNOSIS — J84.9 ILD (INTERSTITIAL LUNG DISEASE) (HCC): ICD-10-CM

## 2020-08-04 DIAGNOSIS — I25.119 CORONARY ARTERY DISEASE INVOLVING NATIVE CORONARY ARTERY OF NATIVE HEART WITH ANGINA PECTORIS (HCC): Primary | ICD-10-CM

## 2020-08-04 DIAGNOSIS — I25.10 LEFT MAIN CORONARY ARTERY DISEASE: ICD-10-CM

## 2020-08-04 PROCEDURE — 99204 OFFICE O/P NEW MOD 45 MIN: CPT | Performed by: THORACIC SURGERY (CARDIOTHORACIC VASCULAR SURGERY)

## 2020-08-04 NOTE — PROGRESS NOTES
08/04/2020  Patient Information  [facility]  [place] [address]   1965  'PCP/Referring Physician'  Marycruz Mckenna APRN  [phone]  Gonzalo Massey MD  [phone]  Chief Complaint   Patient presents with   • Chest Pain     Referred by Dr. Dorian Massey for CAD.        History of Present Illness: 55-year-old  male with a history of hyperlipidemia and tobacco abuse who presents with fatigue and shortness of breath.  The patient's symptoms have been worsening over the past year.  Mr. Haines is fairly stoic in his responses, however his wife tends to fill in more revealing details about significant fatigue and shortness of breath.  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  He denies any chest pain, syncope or lower extremity edema.  The patient underwent recent stenting to the LAD that failed to improve his symptoms.      Patient Active Problem List   Diagnosis   • Coronary artery disease involving native coronary artery of native heart with angina pectoris (CMS/HCC)   • Thrombosis   • Simple chronic bronchitis (CMS/HCC)   • Hyperlipidemia LDL goal <70   • Panlobular emphysema (CMS/HCC)     Past Medical History:   Diagnosis Date   • Coronary artery disease    • Elevated red blood cell count    • GERD (gastroesophageal reflux disease)    • Heart attack (CMS/HCC)    • Hyperlipidemia      Past Surgical History:   Procedure Laterality Date   • CARDIAC CATHETERIZATION N/A 3/17/2020    Procedure: Left Heart Cath;  Surgeon: Bernardo Dixon MD;  Location: [facility] CATH INVASIVE LOCATION;  Service: Cardiology;  Laterality: N/A;       Current Outpatient Medications:   •  albuterol sulfate  (90 Base) MCG/ACT inhaler, Inhale 2 puffs by mouth Every 4 (Four) Hours As Needed for Wheezing., Disp: 18 g, Rfl: 0  •  atorvastatin  (LIPITOR) 80 MG tablet, TAKE 1 TABLET BY MOUTH EVERY NIGHT., Disp: 30 tablet, Rfl: 1  •  famotidine (PEPCID) 20 MG tablet, Take 20 mg by mouth 2 (Two) Times a Day Before Meals., Disp: , Rfl:   •  famotidine (PEPCID) 40 MG tablet, TAKE 1/2 TABLETS BY MOUTH 2 (TWO) TIMES A DAY BEFORE MEALS., Disp: 30 tablet, Rfl: 0  •  fluconazole (DIFLUCAN) 100 MG tablet, Take 100 mg by mouth As Needed., Disp: , Rfl:   •  losartan-hydrochlorothiazide (HYZAAR) 100-12.5 MG per tablet, Take 1 tablet by mouth Daily., Disp: , Rfl:   •  metoprolol succinate XL (TOPROL-XL) 25 MG 24 hr tablet, TAKE 1 TABLET BY MOUTH DAILY., Disp: 30 tablet, Rfl: 1  •  nitroglycerin (NITROSTAT) 0.4 MG SL tablet, Place 1 tablet under the tongue Every 5 (Five) Minutes As Needed for Chest Pain. Take no more than 3 doses in 15 minutes., Disp: 25 tablet, Rfl: 0  •  nystatin (MYCOSTATIN) 117755 UNIT/ML suspension, SWISH AND SPIT 1 TEASPOONFUL BY MOUTH FOUR TIMES DAILY, Disp: 140 mL, Rfl: 0  •  prasugrel (EFFIENT) 10 MG tablet, Take 10 mg by mouth Daily., Disp: , Rfl:   •  budesonide-formoterol (SYMBICORT) 80-4.5 MCG/ACT inhaler, Inhale 2 puffs 2 (Two) Times a Day., Disp: 3 inhaler, Rfl: 3  •  chlorhexidine (PERIDEX) 0.12 % solution, SOAK DENTURES IN AT NIGHT, Disp: 473 mL, Rfl: 0  •  pantoprazole (Protonix) 40 MG EC tablet, Take 1 tablet by mouth Daily., Disp: 30 tablet, Rfl: 5    Current Facility-Administered Medications:   •  aspirin chewable tablet 81 mg, 81 mg, Oral, Once, Gonzalo Massey MD  Allergies   Allergen Reactions   • Nyquil Multi-Symptom [Pseudoeph-Doxylamine-Dm-Apap] Nausea And Vomiting   • Penicillins Rash     Social History     Socioeconomic History   • Marital status:      Spouse name: Not on file   • Number of children: 2   • Years of education: Not on file   • Highest education level: Not on file   Occupational History   • Occupation: [employer]   Tobacco Use   • Smoking status: Former Smoker     Packs/day: 1.50     Years: 40.00      "Pack years: 60.00     Types: Cigarettes     Last attempt to quit: 3/16/2020     Years since quittin.3   • Smokeless tobacco: Current User     Types: Chew   Substance and Sexual Activity   • Alcohol use: Not Currently   • Drug use: Never   • Sexual activity: Defer   Social History Narrative    Lives in [place], [place] with spouse     Family History   Problem Relation Age of Onset   • Heart disease Mother    • Heart attack Mother    • Cancer Father    • Cancer Brother    • Heart disease Brother    • Heart attack Brother    • Heart attack Brother    • Leukemia Brother      Review of Systems   Constitution: Negative for chills, fever, malaise/fatigue, night sweats and weight loss.   HENT: Negative for hearing loss, odynophagia and sore throat.    Cardiovascular: Positive for dyspnea on exertion. Negative for chest pain, leg swelling, orthopnea and palpitations.   Respiratory: Negative for cough and hemoptysis.    Endocrine: Negative for cold intolerance, heat intolerance, polydipsia, polyphagia and polyuria.   Hematologic/Lymphatic: Does not bruise/bleed easily.   Skin: Negative for itching and rash.   Musculoskeletal: Negative for joint pain, joint swelling and myalgias.   Gastrointestinal: Negative for abdominal pain, constipation, diarrhea, hematemesis, hematochezia, melena, nausea and vomiting.   Genitourinary: Negative for dysuria, frequency and hematuria.   Neurological: Negative for focal weakness, headaches, numbness and seizures.   Psychiatric/Behavioral: Negative for depression and suicidal ideas. The patient is not nervous/anxious.    All other systems reviewed and are negative.    Vitals:    20 1211   BP: 122/80   BP Location: Right arm   Patient Position: Sitting   Pulse: 73   Temp: 98.2 °F (36.8 °C)   SpO2: 95%   Weight: 98.4 kg (217 lb)   Height: 172.7 cm (68\")      Physical Exam   Constitutional: He is oriented to person, place, and time. He appears well-developed and well-nourished. No " distress.    male who appears stated age and is present with his wife   HENT:   Head: Normocephalic and atraumatic.   Eyes: Conjunctivae are normal. No scleral icterus.   Neck: Normal range of motion. No JVD present. Carotid bruit is not present. No tracheal deviation present.   Cardiovascular: Normal rate, regular rhythm and normal heart sounds. Exam reveals no gallop and no friction rub.   No murmur heard.  Pulmonary/Chest: Effort normal and breath sounds normal. No stridor. No respiratory distress. He has no wheezes. He has no rales.   Bilateral fingernail clubbing is present in the upper extremities.   Abdominal: Soft. He exhibits no distension and no mass. There is no tenderness. There is no rebound and no guarding.   Musculoskeletal: Normal range of motion. He exhibits no edema.   Neurological: He is alert and oriented to person, place, and time.   Skin: Skin is warm and dry. No rash noted. He is not diaphoretic. No erythema.   Psychiatric: He has a normal mood and affect. His behavior is normal. Judgment and thought content normal.       Labs/Imaging:    -CT of the chest performed 3/18/2020, personally reviewed, demonstrates interlobular septal thickening/fibrotic changes.  -Cardiac catheterization performed 3/17/2020, personally reviewed, demonstrates 70% distal left main stenosis, mid LAD stenosis up to 80% at the takeoff of the first diagonal.  There is distal LAD stenosis up to 80%.  The left main stenosis extends to the ostial circumflex.  The right coronary artery has 70% stenosis.  EF 45 to 50%.  LVEDP 28 mmHg.  Stenting of the mid LAD and distal LAD was performed jailing the diagonal branch.  -Transthoracic echocardiogram performed 3/17/2020, personally reviewed, demonstrates EF 46 to 50%, global left ventricular hypokinesis, mild mitral and tricuspid regurgitation are present.    Assessment/Plan:  55-year-old  male with a history of hyperlipidemia and tobacco abuse who presents with  fatigue and shortness of breath.  The patient has multivessel coronary artery disease including the left main coronary artery.  He also has a CT scan suggestive of pulmonary fibrosis.  I recommended a carotid duplex to rule out significant stenosis given his left main coronary artery disease.  Pulmonary function tests will be obtained to evaluate his parenchymal function in the setting of possible pulmonary fibrosis.  The patient is a reasonable candidate for coronary artery bypass grafting.  The risks and benefits of surgery were discussed with the patient including pain, bleeding, infection, renal failure requiring dialysis, stroke, air leak and death.  The patient understood these risks and wished to proceed with surgery.  Given his recent evaluation with Dr. Caban, a lung biopsy can be performed at the time of cardiac surgery to better evaluate his possible pulmonary fibrosis.    Patient Active Problem List   Diagnosis   • Coronary artery disease involving native coronary artery of native heart with angina pectoris (CMS/HCC)   • Thrombosis   • Simple chronic bronchitis (CMS/HCC)   • Hyperlipidemia LDL goal <70   • Panlobular emphysema (CMS/HCC)

## 2020-08-06 ENCOUNTER — TELEPHONE (OUTPATIENT)
Dept: CARDIAC SURGERY | Facility: CLINIC | Age: 55
End: 2020-08-06

## 2020-08-06 NOTE — TELEPHONE ENCOUNTER
I called Mr. Haines with apt info for PFT & Carotid Duplex for 08/19. I asked him if he would like to go ahead and schedule his surgery but he wants to wait till Dr. Yadav has reviewed his test on 08/19.

## 2020-08-17 ENCOUNTER — LAB (OUTPATIENT)
Dept: LAB | Facility: HOSPITAL | Age: 55
End: 2020-08-17

## 2020-08-17 ENCOUNTER — TRANSCRIBE ORDERS (OUTPATIENT)
Dept: LAB | Facility: HOSPITAL | Age: 55
End: 2020-08-17

## 2020-08-17 DIAGNOSIS — Z01.818 PRE-OP TESTING: Primary | ICD-10-CM

## 2020-08-17 DIAGNOSIS — Z01.818 PRE-OP TESTING: ICD-10-CM

## 2020-08-17 PROCEDURE — U0002 COVID-19 LAB TEST NON-CDC: HCPCS

## 2020-08-17 PROCEDURE — C9803 HOPD COVID-19 SPEC COLLECT: HCPCS

## 2020-08-17 PROCEDURE — U0004 COV-19 TEST NON-CDC HGH THRU: HCPCS

## 2020-08-18 LAB
REF LAB TEST METHOD: NORMAL
SARS-COV-2 RNA RESP QL NAA+PROBE: NOT DETECTED

## 2020-08-19 ENCOUNTER — HOSPITAL ENCOUNTER (OUTPATIENT)
Dept: PULMONOLOGY | Facility: HOSPITAL | Age: 55
Discharge: HOME | End: 2020-08-19
Admitting: PHYSICIAN ASSISTANT

## 2020-08-19 ENCOUNTER — HOSPITAL ENCOUNTER (OUTPATIENT)
Dept: CARDIOLOGY | Facility: HOSPITAL | Age: 55
Discharge: HOME | End: 2020-08-19

## 2020-08-19 VITALS — WEIGHT: 216.93 LBS | HEIGHT: 68 IN | BODY MASS INDEX: 32.88 KG/M2

## 2020-08-19 DIAGNOSIS — Z01.810 PREOPERATIVE CARDIOVASCULAR EXAMINATION: ICD-10-CM

## 2020-08-19 DIAGNOSIS — J84.9 ILD (INTERSTITIAL LUNG DISEASE) (HCC): ICD-10-CM

## 2020-08-19 LAB
ARTERIAL PATENCY WRIST A: ABNORMAL
ATMOSPHERIC PRESS: ABNORMAL MM[HG]
BASE EXCESS BLDA CALC-SCNC: -2.3 MMOL/L (ref 0–2)
BH CV XLRA MEAS LEFT DIST CCA EDV: 13 CM/SEC
BH CV XLRA MEAS LEFT DIST CCA PSV: 56.6 CM/SEC
BH CV XLRA MEAS LEFT DIST ICA EDV: 20.8 CM/SEC
BH CV XLRA MEAS LEFT DIST ICA PSV: 65.6 CM/SEC
BH CV XLRA MEAS LEFT ICA/CCA RATIO: 1
BH CV XLRA MEAS LEFT MID CCA EDV: 15.7 CM/SEC
BH CV XLRA MEAS LEFT MID CCA PSV: 66.8 CM/SEC
BH CV XLRA MEAS LEFT MID ICA EDV: 25.1 CM/SEC
BH CV XLRA MEAS LEFT MID ICA PSV: 68 CM/SEC
BH CV XLRA MEAS LEFT PROX CCA EDV: 16.9 CM/SEC
BH CV XLRA MEAS LEFT PROX CCA PSV: 80.5 CM/SEC
BH CV XLRA MEAS LEFT PROX ECA EDV: 17.3 CM/SEC
BH CV XLRA MEAS LEFT PROX ECA PSV: 122 CM/SEC
BH CV XLRA MEAS LEFT PROX ICA EDV: 15.5 CM/SEC
BH CV XLRA MEAS LEFT PROX ICA PSV: 45 CM/SEC
BH CV XLRA MEAS LEFT PROX SCLA PSV: 121 CM/SEC
BH CV XLRA MEAS LEFT VERTEBRAL A EDV: 6.1 CM/SEC
BH CV XLRA MEAS LEFT VERTEBRAL A PSV: 23.8 CM/SEC
BH CV XLRA MEAS RIGHT DIST CCA EDV: 16.1 CM/SEC
BH CV XLRA MEAS RIGHT DIST CCA PSV: 64.4 CM/SEC
BH CV XLRA MEAS RIGHT DIST ICA EDV: 23.2 CM/SEC
BH CV XLRA MEAS RIGHT DIST ICA PSV: 76.6 CM/SEC
BH CV XLRA MEAS RIGHT ICA/CCA RATIO: 0.94
BH CV XLRA MEAS RIGHT MID CCA EDV: 17.6 CM/SEC
BH CV XLRA MEAS RIGHT MID CCA PSV: 79.2 CM/SEC
BH CV XLRA MEAS RIGHT MID ICA EDV: 23.2 CM/SEC
BH CV XLRA MEAS RIGHT MID ICA PSV: 74.6 CM/SEC
BH CV XLRA MEAS RIGHT PROX CCA EDV: 15.2 CM/SEC
BH CV XLRA MEAS RIGHT PROX CCA PSV: 89.7 CM/SEC
BH CV XLRA MEAS RIGHT PROX ECA EDV: 14.1 CM/SEC
BH CV XLRA MEAS RIGHT PROX ECA PSV: 104 CM/SEC
BH CV XLRA MEAS RIGHT PROX ICA EDV: 13 CM/SEC
BH CV XLRA MEAS RIGHT PROX ICA PSV: 47.5 CM/SEC
BH CV XLRA MEAS RIGHT PROX SCLA PSV: 85.6 CM/SEC
BH CV XLRA MEAS RIGHT VERTEBRAL A EDV: 9.8 CM/SEC
BH CV XLRA MEAS RIGHT VERTEBRAL A PSV: 33 CM/SEC
BODY TEMPERATURE: 37 C
CO2 BLDA-SCNC: 22.9 MMOL/L (ref 22–33)
COHGB MFR BLD: 1.4 % (ref 0–2)
HCO3 BLDA-SCNC: 21.8 MMOL/L (ref 20–26)
HCT VFR BLD CALC: 58.4 %
HGB BLDA-MCNC: 19.1 G/DL (ref 13.5–17.5)
INHALED O2 CONCENTRATION: 21 %
LEFT ARM BP: NORMAL MMHG
METHGB BLD QL: 0.5 % (ref 0–1.5)
MODALITY: ABNORMAL
NOTE: ABNORMAL
OXYHGB MFR BLDV: 93.7 % (ref 94–99)
PCO2 BLDA: 35.7 MM HG (ref 35–45)
PCO2 TEMP ADJ BLD: 35.7 MM HG (ref 35–48)
PH BLDA: 7.39 PH UNITS (ref 7.35–7.45)
PH, TEMP CORRECTED: 7.39 PH UNITS
PO2 BLDA: 74.9 MM HG (ref 83–108)
PO2 TEMP ADJ BLD: 74.9 MM HG (ref 83–108)
RIGHT ARM BP: NORMAL MMHG
VENTILATOR MODE: ABNORMAL

## 2020-08-19 PROCEDURE — 82805 BLOOD GASES W/O2 SATURATION: CPT

## 2020-08-19 PROCEDURE — 36600 WITHDRAWAL OF ARTERIAL BLOOD: CPT

## 2020-08-19 PROCEDURE — 94060 EVALUATION OF WHEEZING: CPT | Performed by: INTERNAL MEDICINE

## 2020-08-19 PROCEDURE — 94727 GAS DIL/WSHOT DETER LNG VOL: CPT | Performed by: INTERNAL MEDICINE

## 2020-08-19 PROCEDURE — 94729 DIFFUSING CAPACITY: CPT | Performed by: INTERNAL MEDICINE

## 2020-08-19 PROCEDURE — 94729 DIFFUSING CAPACITY: CPT

## 2020-08-19 PROCEDURE — 94727 GAS DIL/WSHOT DETER LNG VOL: CPT

## 2020-08-19 PROCEDURE — 93880 EXTRACRANIAL BILAT STUDY: CPT | Performed by: INTERNAL MEDICINE

## 2020-08-19 PROCEDURE — 94060 EVALUATION OF WHEEZING: CPT

## 2020-08-19 PROCEDURE — 93880 EXTRACRANIAL BILAT STUDY: CPT

## 2020-08-19 RX ORDER — ALBUTEROL SULFATE 2.5 MG/3ML
2.5 SOLUTION RESPIRATORY (INHALATION) ONCE
Status: COMPLETED | OUTPATIENT
Start: 2020-08-19 | End: 2020-08-19

## 2020-08-19 RX ADMIN — ALBUTEROL SULFATE 2.5 MG: 2.5 SOLUTION RESPIRATORY (INHALATION) at 10:28

## 2020-08-21 ENCOUNTER — TELEPHONE (OUTPATIENT)
Dept: CARDIAC SURGERY | Facility: CLINIC | Age: 55
End: 2020-08-21

## 2020-08-21 NOTE — TELEPHONE ENCOUNTER
I tried calling Deya Zaki this morning regarding his my chart message but he does not have a VM set up. I have messaged Dr. Mcgill to see when we can get him back in for an apt.

## 2020-08-28 ENCOUNTER — TELEPHONE (OUTPATIENT)
Dept: CARDIAC SURGERY | Facility: CLINIC | Age: 55
End: 2020-08-28

## 2020-08-28 NOTE — TELEPHONE ENCOUNTER
I s/w pt's wife this morning, Angeline, she was the one who sent the message to our office through Mr. Zaki Guevara. She explained that Mr. Haines is very Nervous and anxious about the surgery. I relayed the message from Dr. Mcgill-refer to Tele ENC from 08/28-she stated that she would talk to him and get back with me about his surgery.

## 2020-08-28 NOTE — TELEPHONE ENCOUNTER
----- Message from Rahul Mcgill MD sent at 8/22/2020  1:08 AM EDT -----  Regarding: RE: Results  Carotid duplex was normal.  PFTs were acceptable.  No need for office appt.  I can talk to him in preop about these studies.      ----- Message -----  From: Matthew Milligan  Sent: 8/21/2020   8:03 AM EDT  To: Rahul Mcgill MD  Subject: Results                                          Dr. Mcgill, you saw this pt on 08/04/2020-sent him for a Carotid duplex & PFT prior to his heart surgery with you. He did not want to schedule the surgery till after you have reviewed his test. But now I see in his chart that he has sent our office a message wanting to to know if he can come back in for another apt to go over the results and re-discuss surgery. Is it ok to schedule him an apt to see You instead of Adela? Her clinic is booked out till late September.

## 2020-09-01 ENCOUNTER — TELEPHONE (OUTPATIENT)
Dept: CARDIAC SURGERY | Facility: CLINIC | Age: 55
End: 2020-09-01

## 2020-09-01 NOTE — TELEPHONE ENCOUNTER
"S/w Mr. Haines today, I had s/w his wife, Angeline, back on 08/28 but had not heard back from her or him regarding his pending surgery with Dr. Mcgill. Mr. Haines told me that he \"does not want to have this surgery and he is feeling fine and dose not want me to call him back about this matter\". I will relay this information to Dr. Mcgill.   "

## 2020-09-04 ENCOUNTER — TELEPHONE (OUTPATIENT)
Dept: CARDIAC SURGERY | Facility: CLINIC | Age: 55
End: 2020-09-04

## 2020-09-04 ENCOUNTER — DOCUMENTATION (OUTPATIENT)
Dept: CARDIAC SURGERY | Facility: CLINIC | Age: 55
End: 2020-09-04

## 2020-09-04 NOTE — TELEPHONE ENCOUNTER
I was informed by my office staff that the patient did not want to pursue CABG when attempting to book surgery.  I called Mr. Haines myself and explained that surgery was in his best interest for coronary artery.  We talked about the risks of ignoring this diagnosis including myocardial infarction and death.  He explained that he had never had surgery and did not want open heart surgery to be his first experience.  We talked for some time and he was not interested in pursuing surgery.  I will have my office staff reach out to the patient again to offer surgery.

## 2020-09-08 ENCOUNTER — TELEPHONE (OUTPATIENT)
Dept: CARDIAC SURGERY | Facility: CLINIC | Age: 55
End: 2020-09-08

## 2020-10-03 RX ORDER — ATORVASTATIN CALCIUM 80 MG/1
80 TABLET, FILM COATED ORAL NIGHTLY
Qty: 30 TABLET | Refills: 0 | Status: SHIPPED | OUTPATIENT
Start: 2020-10-03 | End: 2020-11-04

## 2020-10-03 RX ORDER — METOPROLOL SUCCINATE 25 MG/1
25 TABLET, EXTENDED RELEASE ORAL
Qty: 30 TABLET | Refills: 1 | Status: SHIPPED | OUTPATIENT
Start: 2020-10-03 | End: 2020-11-04

## 2020-10-23 ENCOUNTER — TELEPHONE (OUTPATIENT)
Dept: CARDIOLOGY | Facility: CLINIC | Age: 55
End: 2020-10-23

## 2020-10-23 NOTE — TELEPHONE ENCOUNTER
CONTACTED PATIENT IN REGARDS TO MISSED APPOINTMENT. PATIENT STATED HE WILL CALL BACK TO RESCHEDULE.

## 2020-11-03 ENCOUNTER — TELEPHONE (OUTPATIENT)
Dept: CARDIAC SURGERY | Facility: CLINIC | Age: 55
End: 2020-11-03

## 2020-11-03 NOTE — TELEPHONE ENCOUNTER
I had placed Mr. Haines on Dr. Mcgill Pending for his heart surgery with Dr. Mcgill. I have left a few voice mails for Mr. Haines. I will mail him a letter as my last form of contact.

## 2020-11-04 ENCOUNTER — TELEPHONE (OUTPATIENT)
Dept: CARDIAC SURGERY | Facility: CLINIC | Age: 55
End: 2020-11-04

## 2020-11-04 RX ORDER — FAMOTIDINE 20 MG/1
TABLET, FILM COATED ORAL
Qty: 60 TABLET | Refills: 4 | Status: SHIPPED | OUTPATIENT
Start: 2020-11-04 | End: 2021-04-07

## 2020-11-04 RX ORDER — METOPROLOL SUCCINATE 25 MG/1
25 TABLET, EXTENDED RELEASE ORAL
Qty: 30 TABLET | Refills: 4 | Status: SHIPPED | OUTPATIENT
Start: 2020-11-04 | End: 2021-04-07

## 2020-11-04 RX ORDER — ATORVASTATIN CALCIUM 80 MG/1
80 TABLET, FILM COATED ORAL NIGHTLY
Qty: 30 TABLET | Refills: 4 | Status: SHIPPED | OUTPATIENT
Start: 2020-11-04 | End: 2021-04-07

## 2020-11-04 NOTE — TELEPHONE ENCOUNTER
Pt's wife returned my phone call late yesterday afternoon-Mr. Haines still does not wish to proceed with surgery.

## 2020-11-06 ENCOUNTER — OFFICE VISIT (OUTPATIENT)
Dept: FAMILY MEDICINE CLINIC | Facility: CLINIC | Age: 55
End: 2020-11-06

## 2020-11-06 VITALS
TEMPERATURE: 98 F | HEIGHT: 68 IN | SYSTOLIC BLOOD PRESSURE: 126 MMHG | OXYGEN SATURATION: 90 % | DIASTOLIC BLOOD PRESSURE: 76 MMHG | BODY MASS INDEX: 33.19 KG/M2 | WEIGHT: 219 LBS | HEART RATE: 88 BPM

## 2020-11-06 DIAGNOSIS — I25.10 LEFT MAIN CORONARY ARTERY DISEASE: ICD-10-CM

## 2020-11-06 DIAGNOSIS — E78.5 HYPERLIPIDEMIA LDL GOAL <70: ICD-10-CM

## 2020-11-06 DIAGNOSIS — D75.1 POLYCYTHEMIA, SECONDARY: ICD-10-CM

## 2020-11-06 DIAGNOSIS — I25.119 CORONARY ARTERY DISEASE INVOLVING NATIVE CORONARY ARTERY OF NATIVE HEART WITH ANGINA PECTORIS (HCC): Primary | ICD-10-CM

## 2020-11-06 DIAGNOSIS — J30.9 ALLERGIC RHINITIS, UNSPECIFIED SEASONALITY, UNSPECIFIED TRIGGER: ICD-10-CM

## 2020-11-06 DIAGNOSIS — J96.11 CHRONIC RESPIRATORY FAILURE WITH HYPOXIA (HCC): ICD-10-CM

## 2020-11-06 DIAGNOSIS — J84.9 INTERSTITIAL LUNG DISEASE (HCC): ICD-10-CM

## 2020-11-06 PROCEDURE — 99214 OFFICE O/P EST MOD 30 MIN: CPT | Performed by: NURSE PRACTITIONER

## 2020-11-06 RX ORDER — CETIRIZINE HYDROCHLORIDE 10 MG/1
10 TABLET ORAL DAILY
Qty: 30 TABLET | Refills: 5 | Status: SHIPPED | OUTPATIENT
Start: 2020-11-06 | End: 2021-05-10

## 2020-11-06 NOTE — PROGRESS NOTES
Subjective     Chief Complaint:    Chief Complaint   Patient presents with   • Follow-up       History of Present Illness:   Here for f/u.   Has probable ILD with probable COPD. Notes BROWN. Was using symbicort but it made his mouth sore. He stopped it about a week ago.   He does not feel like he needs an inhaler. He has not smoked since his heart attack.   He saw pulm. Does not feel like her walk test was correct. He notes that he was not soa during the test. He has not been using O2.  It made his nosebleed.      Has HTN and CAD. He takes losartan/hctz, metoprolol. Takes effient as well. On statin and ASA as well.  He follows with Dr. Massey however has missed a few appointments.  He was found to have multivessel disease including left main disease.  He saw Dr. Mcgill who recommended CABG.  Patient notes that he lost his peace about the surgery and does not feel he needs it.  Currently he is praying and taking holy communion and does not want surgery at this time.    Review of Systems  Gen- No fevers, chills  CV- No chest pain, palpitations  GI- No N/V/D, abd pain  Neuro-No dizziness, headaches      I have reviewed and/or updated the patient's past medical, surgical, family, social history and problem list as appropriate.     Medications:    Current Outpatient Medications:   •  atorvastatin (LIPITOR) 80 MG tablet, TAKE 1 TABLET BY MOUTH EVERY NIGHT., Disp: 30 tablet, Rfl: 4  •  budesonide-formoterol (SYMBICORT) 80-4.5 MCG/ACT inhaler, Inhale 2 puffs 2 (Two) Times a Day., Disp: 3 inhaler, Rfl: 3  •  chlorhexidine (PERIDEX) 0.12 % solution, SOAK DENTURES IN AT NIGHT, Disp: 473 mL, Rfl: 0  •  famotidine (PEPCID) 20 MG tablet, TAKE 1 TABLET BY MOUTH 2 (TWO) TIMES A DAY BEFORE MEALS., Disp: 60 tablet, Rfl: 4  •  losartan-hydrochlorothiazide (HYZAAR) 100-12.5 MG per tablet, Take 1 tablet by mouth Daily., Disp: , Rfl:   •  metoprolol succinate XL (TOPROL-XL) 25 MG 24 hr tablet, TAKE 1 TABLET BY MOUTH DAILY., Disp: 30  "tablet, Rfl: 4  •  nitroglycerin (NITROSTAT) 0.4 MG SL tablet, Place 1 tablet under the tongue Every 5 (Five) Minutes As Needed for Chest Pain. Take no more than 3 doses in 15 minutes., Disp: 25 tablet, Rfl: 0  •  prasugrel (EFFIENT) 10 MG tablet, Take 10 mg by mouth Daily., Disp: , Rfl:   •  Breo Ellipta 100-25 MCG/INH inhaler, , Disp: , Rfl:   •  cetirizine (zyrTEC) 10 MG tablet, Take 1 tablet by mouth Daily., Disp: 30 tablet, Rfl: 5  •  fluconazole (DIFLUCAN) 100 MG tablet, Take 100 mg by mouth As Needed., Disp: , Rfl:   •  pantoprazole (Protonix) 40 MG EC tablet, Take 1 tablet by mouth Daily., Disp: 30 tablet, Rfl: 5    Current Facility-Administered Medications:   •  aspirin chewable tablet 81 mg, 81 mg, Oral, Once, Gonzalo Massey MD    Allergies:  Allergies   Allergen Reactions   • Nyquil Multi-Symptom [Pseudoeph-Doxylamine-Dm-Apap] Nausea And Vomiting   • Penicillins Rash       Objective     Vital Signs:   Vitals:    11/06/20 1526   BP: 126/76   Pulse: 88   Temp: 98 °F (36.7 °C)   SpO2: 90%   Weight: 99.3 kg (219 lb)   Height: 172.7 cm (68\")       Physical Exam:    Physical Exam  Vitals signs and nursing note reviewed.   Constitutional:       Appearance: Normal appearance. He is well-developed.   HENT:      Head: Normocephalic and atraumatic.   Eyes:      Pupils: Pupils are equal, round, and reactive to light.   Neck:      Musculoskeletal: Neck supple.   Cardiovascular:      Rate and Rhythm: Normal rate and regular rhythm.      Heart sounds: Normal heart sounds.   Pulmonary:      Comments: Fine rales in bases, increased work of breathing with walking.   Walk test performed.  Patient walked 2 laps down clinic hallway before his O2 saturation dropped to 76%.  He became increasingly dyspneic and diaphoretic.  Upon sitting and resting his O2 sat returned to 90% after approximately 3 minutes of room air  Abdominal:      General: Bowel sounds are normal. There is no distension.      Palpations: Abdomen is soft.  "      Tenderness: There is no abdominal tenderness.   Musculoskeletal:         General: No swelling.   Skin:     General: Skin is warm and dry.      Capillary Refill: Capillary refill takes less than 2 seconds.   Neurological:      General: No focal deficit present.      Mental Status: He is alert and oriented to person, place, and time.   Psychiatric:         Mood and Affect: Mood normal.         Behavior: Behavior normal.         Assessment / Plan     Assessment/Plan:   Problem List Items Addressed This Visit        Cardiovascular and Mediastinum    Coronary artery disease involving native coronary artery of native heart with angina pectoris (CMS/HCC)    Relevant Medications    nitroglycerin (NITROSTAT) 0.4 MG SL tablet    prasugrel (EFFIENT) 10 MG tablet    metoprolol succinate XL (TOPROL-XL) 25 MG 24 hr tablet    Hyperlipidemia LDL goal <70    Relevant Medications    atorvastatin (LIPITOR) 80 MG tablet    Left main coronary artery disease    Relevant Medications    nitroglycerin (NITROSTAT) 0.4 MG SL tablet    prasugrel (EFFIENT) 10 MG tablet    metoprolol succinate XL (TOPROL-XL) 25 MG 24 hr tablet    Other Relevant Orders    Comprehensive Metabolic Panel (Completed)       Respiratory    Interstitial lung disease (CMS/HCC)    Relevant Medications    budesonide-formoterol (SYMBICORT) 80-4.5 MCG/ACT inhaler    Breo Ellipta 100-25 MCG/INH inhaler    cetirizine (zyrTEC) 10 MG tablet    Chronic respiratory failure with hypoxia (CMS/HCC)       Hematopoietic and Hemostatic    Polycythemia, secondary      Other Visit Diagnoses     Allergic rhinitis, unspecified seasonality, unspecified trigger    -  Primary    Relevant Medications    cetirizine (zyrTEC) 10 MG tablet        CAD  HTN  HLD  -Patient with multivessel disease including left main disease who refuses surgery at this time previously seen by Dr. Mcgill  - continue statin, Effient, aspirin, metoprolol, losartan/HCTZ  -Long discussion about his multivessel disease  and need for CABG.  Discussed risk of death.  He is understanding of the risks and does not want to proceed with surgery    Interstitial lung disease  Chronic respiratory failure with secondary polycythemia  -Another walk test performed in clinic today proved chronic hypoxia, encouraged him he is oxygen.  Suggested AYR saline gel to help with nosebleeds  -Noncompliant with inhalers  -repeat labs.  May need therapeutic phlebotomy depending what his CBC looks like    Allergic rhinitis  -Cetirizine      Follow up:  3 months    Electronically signed by YAMILE Bruno   11/06/2020 15:58 EST      Please note that portions of this note may have been completed with a voice recognition program. Efforts were made to edit the dictations, but occasionally words are mistranscribed.

## 2020-11-07 LAB
ALBUMIN SERPL-MCNC: 4 G/DL (ref 3.8–4.9)
ALBUMIN/GLOB SERPL: 1.1 {RATIO} (ref 1.2–2.2)
ALP SERPL-CCNC: 95 IU/L (ref 39–117)
ALT SERPL-CCNC: 6 IU/L (ref 0–44)
AST SERPL-CCNC: 12 IU/L (ref 0–40)
BASOPHILS # BLD AUTO: 0.1 X10E3/UL (ref 0–0.2)
BASOPHILS NFR BLD AUTO: 1 %
BILIRUB SERPL-MCNC: 1 MG/DL (ref 0–1.2)
BUN SERPL-MCNC: 14 MG/DL (ref 6–24)
BUN/CREAT SERPL: 11 (ref 9–20)
CALCIUM SERPL-MCNC: 9.1 MG/DL (ref 8.7–10.2)
CHLORIDE SERPL-SCNC: 105 MMOL/L (ref 96–106)
CO2 SERPL-SCNC: 21 MMOL/L (ref 20–29)
CREAT SERPL-MCNC: 1.29 MG/DL (ref 0.76–1.27)
EOSINOPHIL # BLD AUTO: 0.7 X10E3/UL (ref 0–0.4)
EOSINOPHIL NFR BLD AUTO: 7 %
ERYTHROCYTE [DISTWIDTH] IN BLOOD BY AUTOMATED COUNT: 13.9 % (ref 11.6–15.4)
GLOBULIN SER CALC-MCNC: 3.7 G/DL (ref 1.5–4.5)
GLUCOSE SERPL-MCNC: 82 MG/DL (ref 65–99)
HCT VFR BLD AUTO: 56.6 % (ref 37.5–51)
HGB BLD-MCNC: 19.4 G/DL (ref 13–17.7)
IMM GRANULOCYTES # BLD AUTO: 0 X10E3/UL (ref 0–0.1)
IMM GRANULOCYTES NFR BLD AUTO: 0 %
LYMPHOCYTES # BLD AUTO: 4 X10E3/UL (ref 0.7–3.1)
LYMPHOCYTES NFR BLD AUTO: 37 %
MCH RBC QN AUTO: 31.1 PG (ref 26.6–33)
MCHC RBC AUTO-ENTMCNC: 34.3 G/DL (ref 31.5–35.7)
MCV RBC AUTO: 91 FL (ref 79–97)
MONOCYTES # BLD AUTO: 1.2 X10E3/UL (ref 0.1–0.9)
MONOCYTES NFR BLD AUTO: 11 %
NEUTROPHILS # BLD AUTO: 4.8 X10E3/UL (ref 1.4–7)
NEUTROPHILS NFR BLD AUTO: 44 %
PLATELET # BLD AUTO: 267 X10E3/UL (ref 150–450)
POTASSIUM SERPL-SCNC: 3.8 MMOL/L (ref 3.5–5.2)
PROT SERPL-MCNC: 7.7 G/DL (ref 6–8.5)
RBC # BLD AUTO: 6.24 X10E6/UL (ref 4.14–5.8)
SODIUM SERPL-SCNC: 140 MMOL/L (ref 134–144)
WBC # BLD AUTO: 10.8 X10E3/UL (ref 3.4–10.8)

## 2020-11-10 NOTE — PROGRESS NOTES
Please call patient. His bloodwork shows increase in Hgb. Now at 19.4. This is due to his chronic hypoxia. I strongly recommend he wear the oxygen while he is up with activity. Pulmonary recommend theraputic phlebotomy as well (blood donation). Is this something he is interested in doing. It will help decrease his blood counts to a safer level. Kidney numbers are consistently mildly decreased. Avoid motrin or aleve. Tylenol is safer.

## 2020-11-11 ENCOUNTER — TELEPHONE (OUTPATIENT)
Dept: FAMILY MEDICINE CLINIC | Facility: CLINIC | Age: 55
End: 2020-11-11

## 2020-11-11 NOTE — TELEPHONE ENCOUNTER
IT WAS RECOMMENDED PATIENT GIVE BLOOD TO LOWER HIS COUNT.  WIFE WANTS TO KNOW HOW MUCH, HOW OFTEN AND WHERE TO GO TO DO THAT.     BEST CALL NUMBER [phone]

## 2020-11-17 DIAGNOSIS — D75.1 POLYCYTHEMIA: ICD-10-CM

## 2020-11-17 DIAGNOSIS — J84.10 PULMONARY FIBROSIS (HCC): ICD-10-CM

## 2020-11-17 DIAGNOSIS — D58.2 ELEVATED HEMOGLOBIN (HCC): ICD-10-CM

## 2020-11-17 DIAGNOSIS — I82.90 THROMBOSIS: Primary | ICD-10-CM

## 2020-11-18 PROBLEM — J84.10 POSTINFLAMMATORY PULMONARY FIBROSIS (HCC): Status: ACTIVE | Noted: 2020-11-18

## 2020-11-18 PROBLEM — D75.1 POLYCYTHEMIA, SECONDARY: Status: ACTIVE | Noted: 2020-11-18

## 2020-11-18 PROBLEM — D58.2 OTHER HEMOGLOBINOPATHIES (HCC): Status: ACTIVE | Noted: 2020-11-18

## 2020-11-18 RX ORDER — SODIUM CHLORIDE 9 MG/ML
250 INJECTION, SOLUTION INTRAVENOUS ONCE
Status: CANCELLED | OUTPATIENT
Start: 2020-11-20

## 2020-11-20 ENCOUNTER — HOSPITAL ENCOUNTER (OUTPATIENT)
Dept: INFUSION THERAPY | Facility: HOSPITAL | Age: 55
Setting detail: INFUSION SERIES
Discharge: HOME | End: 2020-11-20

## 2020-11-20 VITALS
BODY MASS INDEX: 33.19 KG/M2 | OXYGEN SATURATION: 94 % | TEMPERATURE: 97.5 F | HEART RATE: 72 BPM | DIASTOLIC BLOOD PRESSURE: 73 MMHG | WEIGHT: 219 LBS | HEIGHT: 68 IN | SYSTOLIC BLOOD PRESSURE: 119 MMHG

## 2020-11-20 DIAGNOSIS — D58.2 OTHER HEMOGLOBINOPATHIES (HCC): Primary | ICD-10-CM

## 2020-11-20 DIAGNOSIS — J84.10 POSTINFLAMMATORY PULMONARY FIBROSIS (HCC): ICD-10-CM

## 2020-11-20 DIAGNOSIS — D75.1 POLYCYTHEMIA, SECONDARY: ICD-10-CM

## 2020-11-20 PROCEDURE — 99195 PHLEBOTOMY: CPT

## 2020-11-20 RX ORDER — SODIUM CHLORIDE 9 MG/ML
250 INJECTION, SOLUTION INTRAVENOUS ONCE
Status: CANCELLED | OUTPATIENT
Start: 2020-11-27

## 2020-11-20 RX ORDER — SODIUM CHLORIDE 9 MG/ML
250 INJECTION, SOLUTION INTRAVENOUS ONCE
Status: DISCONTINUED | OUTPATIENT
Start: 2020-11-20 | End: 2020-11-22 | Stop reason: HOSPADM

## 2020-11-24 ENCOUNTER — TELEPHONE (OUTPATIENT)
Dept: FAMILY MEDICINE CLINIC | Facility: CLINIC | Age: 55
End: 2020-11-24

## 2020-11-24 DIAGNOSIS — D75.1 POLYCYTHEMIA: Primary | ICD-10-CM

## 2020-11-24 PROBLEM — J96.11 CHRONIC RESPIRATORY FAILURE WITH HYPOXIA (HCC): Status: ACTIVE | Noted: 2020-11-24

## 2020-11-24 PROBLEM — J84.9 INTERSTITIAL LUNG DISEASE (HCC): Status: ACTIVE | Noted: 2020-11-24

## 2020-11-24 NOTE — TELEPHONE ENCOUNTER
Please call patient and let him know that I have placed an order for a repeat hemoglobin check.  Can he come by the clinic and get this done over the next 1 to 2 days?

## 2020-11-25 ENCOUNTER — LAB (OUTPATIENT)
Dept: FAMILY MEDICINE CLINIC | Facility: CLINIC | Age: 55
End: 2020-11-25

## 2020-11-26 LAB
ERYTHROCYTE [DISTWIDTH] IN BLOOD BY AUTOMATED COUNT: 13.5 % (ref 12.3–15.4)
HCT VFR BLD AUTO: 54.3 % (ref 37.5–51)
HGB BLD-MCNC: 18.4 G/DL (ref 13–17.7)
MCH RBC QN AUTO: 30.3 PG (ref 26.6–33)
MCHC RBC AUTO-ENTMCNC: 33.9 G/DL (ref 31.5–35.7)
MCV RBC AUTO: 89.3 FL (ref 79–97)
RBC # BLD AUTO: 6.08 10*6/MM3 (ref 4.14–5.8)
WBC # BLD AUTO: 10.36 10*3/MM3 (ref 3.4–10.8)

## 2020-11-29 ENCOUNTER — RESULTS ENCOUNTER (OUTPATIENT)
Dept: FAMILY MEDICINE CLINIC | Facility: CLINIC | Age: 55
End: 2020-11-29

## 2020-11-29 DIAGNOSIS — D75.1 POLYCYTHEMIA: ICD-10-CM

## 2020-11-30 DIAGNOSIS — D75.1 POLYCYTHEMIA: Primary | ICD-10-CM

## 2020-11-30 NOTE — PROGRESS NOTES
Repeat hgb is 18. I recommend another therapeutic phlebotomy session in 5 weeks.  I will place the order.   Nissa I will put in the order for the therapeutic phlebotomy if you can make sure it gets on their schedule for in 5 weeks.

## 2020-12-01 ENCOUNTER — TELEPHONE (OUTPATIENT)
Dept: FAMILY MEDICINE CLINIC | Facility: CLINIC | Age: 55
End: 2020-12-01

## 2020-12-01 RX ORDER — SODIUM CHLORIDE 9 MG/ML
250 INJECTION, SOLUTION INTRAVENOUS ONCE
Status: CANCELLED | OUTPATIENT
Start: 2021-01-05

## 2020-12-01 NOTE — TELEPHONE ENCOUNTER
Received call from outpatient infusion to make Marycruz aware at infusion appointment today they offered to help patient get set back up with hematology as he has not kept any of his appointments and patient declined.

## 2021-01-05 ENCOUNTER — HOSPITAL ENCOUNTER (OUTPATIENT)
Dept: INFUSION THERAPY | Facility: HOSPITAL | Age: 56
Setting detail: INFUSION SERIES
Discharge: HOME | End: 2021-01-05

## 2021-01-05 VITALS
SYSTOLIC BLOOD PRESSURE: 104 MMHG | TEMPERATURE: 97.8 F | RESPIRATION RATE: 18 BRPM | OXYGEN SATURATION: 90 % | HEART RATE: 77 BPM | DIASTOLIC BLOOD PRESSURE: 64 MMHG

## 2021-01-05 DIAGNOSIS — D75.1 POLYCYTHEMIA, SECONDARY: Primary | ICD-10-CM

## 2021-01-05 LAB — HGB BLD-MCNC: 18.5 G/DL (ref 13–17.7)

## 2021-01-05 PROCEDURE — 85018 HEMOGLOBIN: CPT | Performed by: NURSE PRACTITIONER

## 2021-01-05 PROCEDURE — 99195 PHLEBOTOMY: CPT

## 2021-01-05 RX ORDER — SODIUM CHLORIDE 9 MG/ML
250 INJECTION, SOLUTION INTRAVENOUS ONCE
Status: CANCELLED | OUTPATIENT
Start: 2021-01-12

## 2021-01-05 RX ORDER — SODIUM CHLORIDE 9 MG/ML
250 INJECTION, SOLUTION INTRAVENOUS ONCE
Status: DISCONTINUED | OUTPATIENT
Start: 2021-01-05 | End: 2021-01-07 | Stop reason: HOSPADM

## 2021-01-08 ENCOUNTER — TELEPHONE (OUTPATIENT)
Dept: FAMILY MEDICINE CLINIC | Facility: CLINIC | Age: 56
End: 2021-01-08

## 2021-01-08 NOTE — TELEPHONE ENCOUNTER
Wanda from out patient infusion called regarding Mr. Haines.    She just needs to know how often to schedule  mr. Haines to have his therapeutic phlebotomy.

## 2021-01-12 ENCOUNTER — APPOINTMENT (OUTPATIENT)
Dept: INFUSION THERAPY | Facility: HOSPITAL | Age: 56
End: 2021-01-12

## 2021-01-19 ENCOUNTER — APPOINTMENT (OUTPATIENT)
Dept: INFUSION THERAPY | Facility: HOSPITAL | Age: 56
End: 2021-01-19

## 2021-02-01 ENCOUNTER — TELEPHONE (OUTPATIENT)
Dept: FAMILY MEDICINE CLINIC | Facility: CLINIC | Age: 56
End: 2021-02-01

## 2021-02-01 NOTE — TELEPHONE ENCOUNTER
Couple days last week, and then this past Friday/Sat, in the mornings, his bp drops to @ 96/60. He rechecks it later in the afternoon and will take his meds at that time, once his bp goes back up      1/31 96/59  2/1 132/73 (patient hadn't taken it today, but took it while we were on the phone)

## 2021-02-01 NOTE — TELEPHONE ENCOUNTER
Please call the patient and see what his readings are. He needs to wait 6 weeks in between phlebotomy

## 2021-02-01 NOTE — TELEPHONE ENCOUNTER
Patient wife called and stated the patients blood pressure is running a little low- also wanted to know if he could have his phlebotomy treatments close together.     Please call and advise at [phone] (h)

## 2021-02-01 NOTE — TELEPHONE ENCOUNTER
Hub to read  Please call the patient and see what his readings are. He needs to wait 6 weeks in between phlebotomy     Called pt and unable to lvm

## 2021-02-02 DIAGNOSIS — I10 ESSENTIAL HYPERTENSION: Primary | ICD-10-CM

## 2021-02-02 RX ORDER — LOSARTAN POTASSIUM 50 MG/1
50 TABLET ORAL DAILY
Qty: 30 TABLET | Refills: 3 | Status: SHIPPED | OUTPATIENT
Start: 2021-02-02 | End: 2021-03-15

## 2021-02-16 ENCOUNTER — APPOINTMENT (OUTPATIENT)
Dept: INFUSION THERAPY | Facility: HOSPITAL | Age: 56
End: 2021-02-16

## 2021-02-24 ENCOUNTER — HOSPITAL ENCOUNTER (OUTPATIENT)
Dept: INFUSION THERAPY | Facility: HOSPITAL | Age: 56
Discharge: HOME | End: 2021-02-24
Admitting: NURSE PRACTITIONER

## 2021-02-24 VITALS
RESPIRATION RATE: 18 BRPM | HEART RATE: 79 BPM | TEMPERATURE: 98.2 F | DIASTOLIC BLOOD PRESSURE: 71 MMHG | OXYGEN SATURATION: 95 % | SYSTOLIC BLOOD PRESSURE: 112 MMHG

## 2021-02-24 DIAGNOSIS — D75.1 POLYCYTHEMIA, SECONDARY: Primary | ICD-10-CM

## 2021-02-24 LAB — HGB BLD-MCNC: 17.6 G/DL (ref 13–17.7)

## 2021-02-24 PROCEDURE — 99195 PHLEBOTOMY: CPT

## 2021-02-24 PROCEDURE — 85018 HEMOGLOBIN: CPT | Performed by: NURSE PRACTITIONER

## 2021-02-24 RX ORDER — SODIUM CHLORIDE 9 MG/ML
250 INJECTION, SOLUTION INTRAVENOUS ONCE
Status: CANCELLED | OUTPATIENT
Start: 2021-03-03

## 2021-02-24 RX ORDER — SODIUM CHLORIDE 9 MG/ML
250 INJECTION, SOLUTION INTRAVENOUS ONCE
Status: DISCONTINUED | OUTPATIENT
Start: 2021-02-24 | End: 2021-02-26 | Stop reason: HOSPADM

## 2021-02-26 ENCOUNTER — TELEPHONE (OUTPATIENT)
Dept: CARDIAC SURGERY | Facility: CLINIC | Age: 56
End: 2021-02-26

## 2021-03-09 ENCOUNTER — TELEPHONE (OUTPATIENT)
Dept: FAMILY MEDICINE CLINIC | Facility: CLINIC | Age: 56
End: 2021-03-09

## 2021-03-09 NOTE — TELEPHONE ENCOUNTER
Called the infusion center. No answer I will call again later today.        Please call the infusion center. His therapeutic phlebotomies need to occur every 6 weeks.  He cannot have them more frequent than that due to hypotension.  I have discussed his case with pulmonology and they recommend continuing to stick with a plan of every 6 weeks.      [phone]

## 2021-03-09 NOTE — TELEPHONE ENCOUNTER
----- Message from Flavio Haines sent at 3/8/2021  8:10 PM EST -----  Regarding: RE: Referral Request  Contact: [phone]

## 2021-03-15 ENCOUNTER — OFFICE VISIT (OUTPATIENT)
Dept: FAMILY MEDICINE CLINIC | Facility: CLINIC | Age: 56
End: 2021-03-15

## 2021-03-15 VITALS
HEIGHT: 68 IN | WEIGHT: 218 LBS | DIASTOLIC BLOOD PRESSURE: 80 MMHG | OXYGEN SATURATION: 94 % | TEMPERATURE: 96.6 F | HEART RATE: 77 BPM | BODY MASS INDEX: 33.04 KG/M2 | SYSTOLIC BLOOD PRESSURE: 124 MMHG

## 2021-03-15 DIAGNOSIS — J84.9 INTERSTITIAL LUNG DISEASE (HCC): ICD-10-CM

## 2021-03-15 DIAGNOSIS — I10 ESSENTIAL HYPERTENSION: Primary | ICD-10-CM

## 2021-03-15 DIAGNOSIS — I25.10 LEFT MAIN CORONARY ARTERY DISEASE: ICD-10-CM

## 2021-03-15 DIAGNOSIS — J96.11 CHRONIC RESPIRATORY FAILURE WITH HYPOXIA (HCC): ICD-10-CM

## 2021-03-15 DIAGNOSIS — D75.1 POLYCYTHEMIA, SECONDARY: ICD-10-CM

## 2021-03-15 DIAGNOSIS — E78.5 HYPERLIPIDEMIA LDL GOAL <70: ICD-10-CM

## 2021-03-15 PROCEDURE — 99214 OFFICE O/P EST MOD 30 MIN: CPT | Performed by: NURSE PRACTITIONER

## 2021-03-15 NOTE — PROGRESS NOTES
Subjective     Chief Complaint:    Chief Complaint   Patient presents with   • Hypotension       History of Present Illness:   Notes BP has been running low. Over the weekend SBP was 90's. He takes losartan and metoprolol. He held Losartan 100mg. BP came back up to low 100's after a few days. He has started taking losartan 50mg. He reports headache and fatigue when BP is low.   Has has MVCAD with left main disease. He has was referred to Dr. Mcgill for eval for CABG which was recommended. Patient declines surgery and continues to decline. He is aware of risks. He continues on ASA, effient, and statin.   He has ILD, probably pulmonary fibrosis with chronic hypoxia. Has seen pulm in the past. Does not care to see again. He does not wear O2 because it makes his nose dry and stuffy. Will also have nose bleeds.  Chronic hypoxia leads to elevated hemoglobin. He gets therapeutic phlebotomy every 6 weeks. Tolerating    He continues to reports SOA that is progressively worsening. He denies any chest pain.     Answers for HPI/ROS submitted by the patient on 3/13/2021  What is the primary reason for your visit?: Other  Please describe your symptoms.: Low blood pressure headaches, swelling in feet sometimes tiredness  Have you had these symptoms before?: Yes  How long have you been having these symptoms?: 1-2 weeks  Please describe any probable cause for these symptoms. : High blood pressure medication?      Review of Systems  Gen- No fevers, chills  CV- No chest pain, palpitations  GI- No N/V/D, abd pain  Neuro-No dizziness, headaches      I have reviewed and/or updated the patient's past medical, surgical, family, social history and problem list as appropriate.     Medications:    Current Outpatient Medications:   •  atorvastatin (LIPITOR) 80 MG tablet, TAKE 1 TABLET BY MOUTH EVERY NIGHT., Disp: 30 tablet, Rfl: 4  •  cetirizine (zyrTEC) 10 MG tablet, Take 1 tablet by mouth Daily., Disp: 30 tablet, Rfl: 5  •  famotidine  "(PEPCID) 20 MG tablet, TAKE 1 TABLET BY MOUTH 2 (TWO) TIMES A DAY BEFORE MEALS., Disp: 60 tablet, Rfl: 4  •  metoprolol succinate XL (TOPROL-XL) 25 MG 24 hr tablet, TAKE 1 TABLET BY MOUTH DAILY., Disp: 30 tablet, Rfl: 4  •  nitroglycerin (NITROSTAT) 0.4 MG SL tablet, Place 1 tablet under the tongue Every 5 (Five) Minutes As Needed for Chest Pain. Take no more than 3 doses in 15 minutes., Disp: 25 tablet, Rfl: 0  •  prasugrel (EFFIENT) 10 MG tablet, Take 10 mg by mouth Daily., Disp: , Rfl:     Current Facility-Administered Medications:   •  aspirin chewable tablet 81 mg, 81 mg, Oral, Once, Gonzalo Massey MD    Allergies:  Allergies   Allergen Reactions   • Nyquil Multi-Symptom [Pseudoeph-Doxylamine-Dm-Apap] Nausea And Vomiting   • Penicillins Rash       Objective     Vital Signs:   Vitals:    03/15/21 1021 03/15/21 1032 03/15/21 1034 03/15/21 1036   BP: 122/70 140/90 132/84 124/80   BP Location:  Right arm  Right arm   Patient Position:  Lying  Standing   Pulse: 87 80 80 77   Temp: 96.6 °F (35.9 °C)      SpO2: 94% 92% 94% 94%   Weight: 98.9 kg (218 lb)      Height: 172.7 cm (68\")      PainSc: 0-No pain          Physical Exam:    Physical Exam  Vitals and nursing note reviewed.   Constitutional:       Appearance: Normal appearance. He is well-developed.      Comments: Chronically ill appearing    HENT:      Head: Normocephalic and atraumatic.   Eyes:      Pupils: Pupils are equal, round, and reactive to light.   Cardiovascular:      Rate and Rhythm: Normal rate and regular rhythm.      Heart sounds: Normal heart sounds.   Pulmonary:      Effort: Pulmonary effort is normal.      Comments: Does get a little dyspneic while talking, fine Velcro rhales noted throughout  Abdominal:      General: Bowel sounds are normal. There is no distension.      Palpations: Abdomen is soft.      Tenderness: There is no abdominal tenderness.   Musculoskeletal:         General: No swelling.      Cervical back: Neck supple.      " Comments: Severe finger clubbing noted   Skin:     General: Skin is warm and dry.      Capillary Refill: Capillary refill takes less than 2 seconds.   Neurological:      General: No focal deficit present.      Mental Status: He is alert and oriented to person, place, and time.   Psychiatric:         Mood and Affect: Mood normal.         Behavior: Behavior normal.         Assessment / Plan     Assessment/Plan:   Problem List Items Addressed This Visit        Cardiac and Vasculature    Hyperlipidemia LDL goal <70    Relevant Medications    atorvastatin (LIPITOR) 80 MG tablet    Other Relevant Orders    Lipid Panel    Left main coronary artery disease    Relevant Medications    nitroglycerin (NITROSTAT) 0.4 MG SL tablet    prasugrel (EFFIENT) 10 MG tablet    metoprolol succinate XL (TOPROL-XL) 25 MG 24 hr tablet    Essential hypertension - Primary    Relevant Medications    metoprolol succinate XL (TOPROL-XL) 25 MG 24 hr tablet    Other Relevant Orders    Lipid Panel    Comprehensive Metabolic Panel    CBC Auto Differential       Hematology and Neoplasia    Polycythemia, secondary       Pulmonary and Pneumonias    Interstitial lung disease (CMS/HCC)    Relevant Medications    cetirizine (zyrTEC) 10 MG tablet    Chronic respiratory failure with hypoxia (CMS/HCC)        -- orders as above  -- stop losartan. Continue metoprolol. Meds, diet, and lifestyle recommendation discussed at length. Home BP monitoring encouraged and appropriate intervals discussed.   -- continue every 6 week therapeutic phlebotomy due to polycythemia  -- strongly encouraged him to wear his oxygen. I suggested AYR saline gel to nares as needed for congestion.   -- again discussed his left main disease, chronic hypoxia, pulm fibrosis at length. He is at high risk of death. He is aware of the risks and does not want any further treatment at this time.   -- continue asa, effient, statin     Follow up:  3 months     Electronically signed by Marycruz  YAMILE Mckenna   03/15/2021 10:35 EDT      Please note that portions of this note may have been completed with a voice recognition program. Efforts were made to edit the dictations, but occasionally words are mistranscribed.

## 2021-03-16 PROBLEM — N18.2 STAGE 2 CHRONIC KIDNEY DISEASE: Status: ACTIVE | Noted: 2021-03-16

## 2021-03-16 PROBLEM — I82.90 THROMBOSIS: Status: RESOLVED | Noted: 2020-03-25 | Resolved: 2021-03-16

## 2021-03-16 LAB
ALBUMIN SERPL-MCNC: 4 G/DL (ref 3.5–5.2)
ALBUMIN/GLOB SERPL: 1.1 G/DL
ALP SERPL-CCNC: 96 U/L (ref 39–117)
ALT SERPL-CCNC: 11 U/L (ref 1–41)
AST SERPL-CCNC: 17 U/L (ref 1–40)
BASOPHILS # BLD AUTO: 0.1 10*3/MM3 (ref 0–0.2)
BASOPHILS NFR BLD AUTO: 1 % (ref 0–1.5)
BILIRUB SERPL-MCNC: 0.7 MG/DL (ref 0–1.2)
BUN SERPL-MCNC: 13 MG/DL (ref 6–20)
BUN/CREAT SERPL: 9.6 (ref 7–25)
CALCIUM SERPL-MCNC: 9.2 MG/DL (ref 8.6–10.5)
CHLORIDE SERPL-SCNC: 106 MMOL/L (ref 98–107)
CHOLEST SERPL-MCNC: 80 MG/DL (ref 0–200)
CO2 SERPL-SCNC: 20.9 MMOL/L (ref 22–29)
CREAT SERPL-MCNC: 1.35 MG/DL (ref 0.76–1.27)
EOSINOPHIL # BLD AUTO: 0.58 10*3/MM3 (ref 0–0.4)
EOSINOPHIL NFR BLD AUTO: 5.5 % (ref 0.3–6.2)
ERYTHROCYTE [DISTWIDTH] IN BLOOD BY AUTOMATED COUNT: 17.3 % (ref 12.3–15.4)
GLOBULIN SER CALC-MCNC: 3.7 GM/DL
GLUCOSE SERPL-MCNC: 81 MG/DL (ref 65–99)
HCT VFR BLD AUTO: 57 % (ref 37.5–51)
HDLC SERPL-MCNC: 40 MG/DL (ref 40–60)
HGB BLD-MCNC: 18.3 G/DL (ref 13–17.7)
IMM GRANULOCYTES # BLD AUTO: 0.03 10*3/MM3 (ref 0–0.05)
IMM GRANULOCYTES NFR BLD AUTO: 0.3 % (ref 0–0.5)
LDLC SERPL CALC-MCNC: 29 MG/DL (ref 0–100)
LYMPHOCYTES # BLD AUTO: 3.17 10*3/MM3 (ref 0.7–3.1)
LYMPHOCYTES NFR BLD AUTO: 30.3 % (ref 19.6–45.3)
MCH RBC QN AUTO: 26.6 PG (ref 26.6–33)
MCHC RBC AUTO-ENTMCNC: 32.1 G/DL (ref 31.5–35.7)
MCV RBC AUTO: 82.7 FL (ref 79–97)
MONOCYTES # BLD AUTO: 0.95 10*3/MM3 (ref 0.1–0.9)
MONOCYTES NFR BLD AUTO: 9.1 % (ref 5–12)
NEUTROPHILS # BLD AUTO: 5.64 10*3/MM3 (ref 1.7–7)
NEUTROPHILS NFR BLD AUTO: 53.8 % (ref 42.7–76)
NRBC BLD AUTO-RTO: 0 /100 WBC (ref 0–0.2)
PLATELET # BLD AUTO: 291 10*3/MM3 (ref 140–450)
POTASSIUM SERPL-SCNC: 4.5 MMOL/L (ref 3.5–5.2)
PROT SERPL-MCNC: 7.7 G/DL (ref 6–8.5)
RBC # BLD AUTO: 6.89 10*6/MM3 (ref 4.14–5.8)
SODIUM SERPL-SCNC: 141 MMOL/L (ref 136–145)
TRIGL SERPL-MCNC: 38 MG/DL (ref 0–150)
VLDLC SERPL CALC-MCNC: 11 MG/DL (ref 5–40)
WBC # BLD AUTO: 10.47 10*3/MM3 (ref 3.4–10.8)

## 2021-03-16 NOTE — PROGRESS NOTES
Labs are stable. He has mild chronic kidney disease likely from poor perfusion. No change in treatment just watch it. Tylenol is safe for headache. Avoid ibuprofen and aleve.

## 2021-03-23 ENCOUNTER — BULK ORDERING (OUTPATIENT)
Dept: CASE MANAGEMENT | Facility: OTHER | Age: 56
End: 2021-03-23

## 2021-03-23 DIAGNOSIS — Z23 IMMUNIZATION DUE: ICD-10-CM

## 2021-04-07 ENCOUNTER — HOSPITAL ENCOUNTER (OUTPATIENT)
Dept: INFUSION THERAPY | Facility: HOSPITAL | Age: 56
Discharge: HOME | End: 2021-04-07
Admitting: NURSE PRACTITIONER

## 2021-04-07 VITALS
SYSTOLIC BLOOD PRESSURE: 122 MMHG | HEART RATE: 77 BPM | DIASTOLIC BLOOD PRESSURE: 87 MMHG | RESPIRATION RATE: 16 BRPM | TEMPERATURE: 98.1 F | OXYGEN SATURATION: 96 %

## 2021-04-07 DIAGNOSIS — D75.1 POLYCYTHEMIA, SECONDARY: ICD-10-CM

## 2021-04-07 LAB
BASOPHILS # BLD AUTO: 0.07 10*3/MM3 (ref 0–0.2)
BASOPHILS NFR BLD AUTO: 0.9 % (ref 0–1.5)
DEPRECATED RDW RBC AUTO: 48.6 FL (ref 37–54)
EOSINOPHIL # BLD AUTO: 0.62 10*3/MM3 (ref 0–0.4)
EOSINOPHIL NFR BLD AUTO: 7.7 % (ref 0.3–6.2)
ERYTHROCYTE [DISTWIDTH] IN BLOOD BY AUTOMATED COUNT: 18.1 % (ref 12.3–15.4)
HCT VFR BLD AUTO: 54.8 % (ref 37.5–51)
HGB BLD-MCNC: 17.1 G/DL (ref 13–17.7)
IMM GRANULOCYTES # BLD AUTO: 0.02 10*3/MM3 (ref 0–0.05)
IMM GRANULOCYTES NFR BLD AUTO: 0.2 % (ref 0–0.5)
LYMPHOCYTES # BLD AUTO: 1.9 10*3/MM3 (ref 0.7–3.1)
LYMPHOCYTES NFR BLD AUTO: 23.6 % (ref 19.6–45.3)
MCH RBC QN AUTO: 25.3 PG (ref 26.6–33)
MCHC RBC AUTO-ENTMCNC: 31.2 G/DL (ref 31.5–35.7)
MCV RBC AUTO: 81.1 FL (ref 79–97)
MONOCYTES # BLD AUTO: 0.51 10*3/MM3 (ref 0.1–0.9)
MONOCYTES NFR BLD AUTO: 6.3 % (ref 5–12)
NEUTROPHILS NFR BLD AUTO: 4.94 10*3/MM3 (ref 1.7–7)
NEUTROPHILS NFR BLD AUTO: 61.3 % (ref 42.7–76)
NRBC BLD AUTO-RTO: 0 /100 WBC (ref 0–0.2)
PLATELET # BLD AUTO: 257 10*3/MM3 (ref 140–450)
PMV BLD AUTO: 9.5 FL (ref 6–12)
RBC # BLD AUTO: 6.76 10*6/MM3 (ref 4.14–5.8)
WBC # BLD AUTO: 8.06 10*3/MM3 (ref 3.4–10.8)

## 2021-04-07 PROCEDURE — 85025 COMPLETE CBC W/AUTO DIFF WBC: CPT | Performed by: NURSE PRACTITIONER

## 2021-04-07 PROCEDURE — 99195 PHLEBOTOMY: CPT

## 2021-04-07 PROCEDURE — 36415 COLL VENOUS BLD VENIPUNCTURE: CPT | Performed by: NURSE PRACTITIONER

## 2021-04-07 RX ORDER — ATORVASTATIN CALCIUM 80 MG/1
TABLET, FILM COATED ORAL
Qty: 30 TABLET | Refills: 3 | Status: SHIPPED | OUTPATIENT
Start: 2021-04-07 | End: 2021-07-30

## 2021-04-07 RX ORDER — METOPROLOL SUCCINATE 25 MG/1
TABLET, EXTENDED RELEASE ORAL
Qty: 30 TABLET | Refills: 3 | Status: SHIPPED | OUTPATIENT
Start: 2021-04-07 | End: 2021-07-30

## 2021-04-07 RX ORDER — FAMOTIDINE 20 MG/1
TABLET, FILM COATED ORAL
Qty: 60 TABLET | Refills: 3 | Status: SHIPPED | OUTPATIENT
Start: 2021-04-07 | End: 2021-07-30

## 2021-04-07 RX ORDER — SODIUM CHLORIDE 9 MG/ML
250 INJECTION, SOLUTION INTRAVENOUS ONCE
Status: CANCELLED | OUTPATIENT
Start: 2021-04-14

## 2021-04-19 RX ORDER — PRASUGREL 10 MG/1
TABLET, FILM COATED ORAL
Qty: 90 TABLET | Refills: 2 | Status: SHIPPED | OUTPATIENT
Start: 2021-04-19

## 2021-04-19 NOTE — TELEPHONE ENCOUNTER
----- Message from Flavio Haines sent at 4/19/2021 10:58 AM EDT -----  Regarding: Prescription Question  Contact: [phone]  Flavio  is completely out of the blood thinner prausgral could you get it refilled soon as possibility  thank you

## 2021-05-10 DIAGNOSIS — J30.9 ALLERGIC RHINITIS, UNSPECIFIED SEASONALITY, UNSPECIFIED TRIGGER: ICD-10-CM

## 2021-05-10 RX ORDER — CETIRIZINE HYDROCHLORIDE 10 MG/1
TABLET ORAL
Qty: 30 TABLET | Refills: 4 | Status: SHIPPED | OUTPATIENT
Start: 2021-05-10 | End: 2021-07-02

## 2021-05-18 RX ORDER — SODIUM CHLORIDE 9 MG/ML
250 INJECTION, SOLUTION INTRAVENOUS ONCE
Status: CANCELLED | OUTPATIENT
Start: 2021-05-18

## 2021-05-19 ENCOUNTER — HOSPITAL ENCOUNTER (OUTPATIENT)
Dept: INFUSION THERAPY | Facility: HOSPITAL | Age: 56
Discharge: HOME | End: 2021-05-19
Admitting: NURSE PRACTITIONER

## 2021-05-19 VITALS
DIASTOLIC BLOOD PRESSURE: 82 MMHG | RESPIRATION RATE: 16 BRPM | SYSTOLIC BLOOD PRESSURE: 126 MMHG | OXYGEN SATURATION: 95 % | BODY MASS INDEX: 33.15 KG/M2 | TEMPERATURE: 97.7 F | HEART RATE: 78 BPM | WEIGHT: 218 LBS

## 2021-05-19 DIAGNOSIS — D58.2 OTHER HEMOGLOBINOPATHIES (HCC): ICD-10-CM

## 2021-05-19 LAB
DEPRECATED RDW RBC AUTO: 48.3 FL (ref 37–54)
ERYTHROCYTE [DISTWIDTH] IN BLOOD BY AUTOMATED COUNT: 19.2 % (ref 12.3–15.4)
HCT VFR BLD AUTO: 57.1 % (ref 37.5–51)
HGB BLD-MCNC: 17 G/DL (ref 13–17.7)
MCH RBC QN AUTO: 23 PG (ref 26.6–33)
MCHC RBC AUTO-ENTMCNC: 29.8 G/DL (ref 31.5–35.7)
MCV RBC AUTO: 77.2 FL (ref 79–97)
PLATELET # BLD AUTO: 286 10*3/MM3 (ref 140–450)
PMV BLD AUTO: 9.6 FL (ref 6–12)
RBC # BLD AUTO: 7.4 10*6/MM3 (ref 4.14–5.8)
WBC # BLD AUTO: 8.99 10*3/MM3 (ref 3.4–10.8)

## 2021-05-19 PROCEDURE — 99195 PHLEBOTOMY: CPT

## 2021-05-19 PROCEDURE — 36415 COLL VENOUS BLD VENIPUNCTURE: CPT

## 2021-05-19 PROCEDURE — 85027 COMPLETE CBC AUTOMATED: CPT | Performed by: NURSE PRACTITIONER

## 2021-06-30 ENCOUNTER — APPOINTMENT (OUTPATIENT)
Dept: INFUSION THERAPY | Facility: HOSPITAL | Age: 56
End: 2021-06-30

## 2021-07-02 RX ORDER — FEXOFENADINE HCL 180 MG/1
180 TABLET ORAL DAILY
Qty: 30 TABLET | Refills: 5 | Status: SHIPPED | OUTPATIENT
Start: 2021-07-02

## 2021-07-02 RX ORDER — TRIAMCINOLONE ACETONIDE 55 UG/1
2 SPRAY, METERED NASAL DAILY
Qty: 16.5 G | Refills: 11 | Status: SHIPPED | OUTPATIENT
Start: 2021-07-02

## 2021-07-07 DIAGNOSIS — D75.1 POLYCYTHEMIA, SECONDARY: Primary | ICD-10-CM

## 2021-07-09 ENCOUNTER — APPOINTMENT (OUTPATIENT)
Dept: INFUSION THERAPY | Facility: HOSPITAL | Age: 56
End: 2021-07-09

## 2021-07-13 RX ORDER — HYDROXYZINE HYDROCHLORIDE 10 MG/1
TABLET, FILM COATED ORAL
Qty: 30 TABLET | Refills: 0 | Status: SHIPPED | OUTPATIENT
Start: 2021-07-13 | End: 2021-08-12

## 2021-07-14 ENCOUNTER — HOSPITAL ENCOUNTER (OUTPATIENT)
Dept: INFUSION THERAPY | Facility: HOSPITAL | Age: 56
Discharge: HOME | End: 2021-07-14
Admitting: NURSE PRACTITIONER

## 2021-07-14 VITALS
SYSTOLIC BLOOD PRESSURE: 117 MMHG | DIASTOLIC BLOOD PRESSURE: 77 MMHG | TEMPERATURE: 97.7 F | OXYGEN SATURATION: 87 % | HEART RATE: 87 BPM

## 2021-07-14 DIAGNOSIS — D75.1 POLYCYTHEMIA, SECONDARY: Primary | ICD-10-CM

## 2021-07-14 LAB — HGB BLD-MCNC: 15.9 G/DL (ref 13–17.7)

## 2021-07-14 PROCEDURE — G0463 HOSPITAL OUTPT CLINIC VISIT: HCPCS

## 2021-07-14 PROCEDURE — 36415 COLL VENOUS BLD VENIPUNCTURE: CPT

## 2021-07-14 PROCEDURE — 85018 HEMOGLOBIN: CPT | Performed by: NURSE PRACTITIONER

## 2021-07-14 RX ORDER — SODIUM CHLORIDE 9 MG/ML
250 INJECTION, SOLUTION INTRAVENOUS ONCE
Status: DISCONTINUED | OUTPATIENT
Start: 2021-07-14 | End: 2021-07-16 | Stop reason: HOSPADM

## 2021-07-14 RX ORDER — SODIUM CHLORIDE 9 MG/ML
250 INJECTION, SOLUTION INTRAVENOUS ONCE
Status: CANCELLED | OUTPATIENT
Start: 2021-07-14

## 2021-07-30 RX ORDER — METOPROLOL SUCCINATE 25 MG/1
TABLET, EXTENDED RELEASE ORAL
Qty: 30 TABLET | Refills: 2 | Status: SHIPPED | OUTPATIENT
Start: 2021-07-30

## 2021-07-30 RX ORDER — FAMOTIDINE 20 MG/1
TABLET, FILM COATED ORAL
Qty: 60 TABLET | Refills: 2 | Status: SHIPPED | OUTPATIENT
Start: 2021-07-30

## 2021-07-30 RX ORDER — ATORVASTATIN CALCIUM 80 MG/1
TABLET, FILM COATED ORAL
Qty: 30 TABLET | Refills: 2 | Status: SHIPPED | OUTPATIENT
Start: 2021-07-30

## 2021-08-12 RX ORDER — HYDROXYZINE HYDROCHLORIDE 10 MG/1
TABLET, FILM COATED ORAL
Qty: 30 TABLET | Refills: 0 | Status: SHIPPED | OUTPATIENT
Start: 2021-08-12

## 2021-08-25 ENCOUNTER — APPOINTMENT (OUTPATIENT)
Dept: INFUSION THERAPY | Facility: HOSPITAL | Age: 56
End: 2021-08-25

## 2021-09-10 ENCOUNTER — TELEPHONE (OUTPATIENT)
Dept: CARDIOLOGY | Facility: CLINIC | Age: 56
End: 2021-09-10

## 2021-09-10 NOTE — TELEPHONE ENCOUNTER
----- Message from Flavio Haines sent at 9/9/2021  8:08 PM EDT -----  Regarding: Non-Urgent Medical Question  Contact: [phone]  Dr auguste   You only met flavio Haines once  you referred him on to [facility] Hern[facility]dez passed away Aug.23 2021. I am just trying to put things together in my head. I was wandering  2 weeks before his first heart attack he had a very bad nose hemorage  on the 22 he developed another  and had a balloon in his nose when he passed . if this information could help or save another's life I pray it will . thank you   Angeline Haines

## (undated) DEVICE — GLIDESHEATH ACCESS KIT HYDROPHILIC COATED INTRODUCER SHEATH: Brand: GLIDESHEATH

## (undated) DEVICE — TREK CORONARY DILATATION CATHETER 2.75 MM X 15 MM / RAPID-EXCHANGE: Brand: TREK

## (undated) DEVICE — NAVVUS CATHETER, P/N 701481-002: Brand: ACIST NAVVUS® CATHETER

## (undated) DEVICE — NC TREK CORONARY DILATATION CATHETER 3.5 MM X 15 MM / RAPID-EXCHANGE: Brand: NC TREK

## (undated) DEVICE — ANGIOGRAPHIC CATHETER: Brand: EXPO™

## (undated) DEVICE — GUIDE CATHETER: Brand: MACH1™

## (undated) DEVICE — GW COUGAR XT HYDROTRACK JTIP 190CM

## (undated) DEVICE — CATH F6 ST JR 4 100CM: Brand: SUPERTORQUE

## (undated) DEVICE — GW EMR FIX EXCHG J STD .035 3MM 260CM

## (undated) DEVICE — INFLATION DEVICE KIT: Brand: ENCORE™ 26 ADVANTAGE KIT

## (undated) DEVICE — RADIFOCUS OPTITORQUE ANGIOGRAPHIC CATHETER: Brand: OPTITORQUE